# Patient Record
Sex: MALE | Race: WHITE | NOT HISPANIC OR LATINO | Employment: UNEMPLOYED | URBAN - METROPOLITAN AREA
[De-identification: names, ages, dates, MRNs, and addresses within clinical notes are randomized per-mention and may not be internally consistent; named-entity substitution may affect disease eponyms.]

---

## 2020-08-03 ENCOUNTER — HOSPITAL ENCOUNTER (EMERGENCY)
Facility: HOSPITAL | Age: 65
Discharge: HOME | End: 2020-08-04
Attending: FAMILY MEDICINE
Payer: COMMERCIAL

## 2020-08-03 DIAGNOSIS — N20.1 RIGHT URETERAL STONE: Primary | ICD-10-CM

## 2020-08-03 LAB
ALBUMIN SERPL BCP-MCNC: 3.9 G/DL (ref 3.5–5.2)
ALP SERPL-CCNC: 62 U/L (ref 55–135)
ALT SERPL W/O P-5'-P-CCNC: 36 U/L (ref 10–44)
ANION GAP SERPL CALC-SCNC: 11 MMOL/L (ref 8–16)
AST SERPL-CCNC: 29 U/L (ref 10–40)
BACTERIA #/AREA URNS HPF: ABNORMAL /HPF
BASOPHILS # BLD AUTO: 0.11 K/UL (ref 0–0.2)
BASOPHILS NFR BLD: 1.2 % (ref 0–1.9)
BILIRUB SERPL-MCNC: 0.5 MG/DL (ref 0.1–1)
BILIRUB UR QL STRIP: NEGATIVE
BUN SERPL-MCNC: 21 MG/DL (ref 8–23)
CALCIUM SERPL-MCNC: 9.5 MG/DL (ref 8.7–10.5)
CHLORIDE SERPL-SCNC: 102 MMOL/L (ref 95–110)
CLARITY UR: CLEAR
CO2 SERPL-SCNC: 29 MMOL/L (ref 23–29)
COLOR UR: YELLOW
CREAT SERPL-MCNC: 1.5 MG/DL (ref 0.5–1.4)
DIFFERENTIAL METHOD: NORMAL
EOSINOPHIL # BLD AUTO: 0.3 K/UL (ref 0–0.5)
EOSINOPHIL NFR BLD: 3.6 % (ref 0–8)
ERYTHROCYTE [DISTWIDTH] IN BLOOD BY AUTOMATED COUNT: 11.8 % (ref 11.5–14.5)
EST. GFR  (AFRICAN AMERICAN): 56.9 ML/MIN/1.73 M^2
EST. GFR  (NON AFRICAN AMERICAN): 49.2 ML/MIN/1.73 M^2
GLUCOSE SERPL-MCNC: 100 MG/DL (ref 70–110)
GLUCOSE UR QL STRIP: NEGATIVE
HCT VFR BLD AUTO: 41.8 % (ref 40–54)
HGB BLD-MCNC: 14 G/DL (ref 14–18)
HGB UR QL STRIP: ABNORMAL
IMM GRANULOCYTES # BLD AUTO: 0.02 K/UL (ref 0–0.04)
IMM GRANULOCYTES NFR BLD AUTO: 0.2 % (ref 0–0.5)
KETONES UR QL STRIP: NEGATIVE
LEUKOCYTE ESTERASE UR QL STRIP: NEGATIVE
LIPASE SERPL-CCNC: 36 U/L (ref 4–60)
LYMPHOCYTES # BLD AUTO: 3 K/UL (ref 1–4.8)
LYMPHOCYTES NFR BLD: 32.2 % (ref 18–48)
MCH RBC QN AUTO: 29.8 PG (ref 27–31)
MCHC RBC AUTO-ENTMCNC: 33.5 G/DL (ref 32–36)
MCV RBC AUTO: 89 FL (ref 82–98)
MICROSCOPIC COMMENT: ABNORMAL
MONOCYTES # BLD AUTO: 1 K/UL (ref 0.3–1)
MONOCYTES NFR BLD: 10.1 % (ref 4–15)
NEUTROPHILS # BLD AUTO: 5 K/UL (ref 1.8–7.7)
NEUTROPHILS NFR BLD: 52.7 % (ref 38–73)
NITRITE UR QL STRIP: NEGATIVE
NRBC BLD-RTO: 0 /100 WBC
PH UR STRIP: 8 [PH] (ref 5–8)
PLATELET # BLD AUTO: 261 K/UL (ref 150–350)
PMV BLD AUTO: 9.5 FL (ref 9.2–12.9)
POTASSIUM SERPL-SCNC: 4.1 MMOL/L (ref 3.5–5.1)
PROT SERPL-MCNC: 6.9 G/DL (ref 6–8.4)
PROT UR QL STRIP: NEGATIVE
RBC # BLD AUTO: 4.7 M/UL (ref 4.6–6.2)
RBC #/AREA URNS HPF: 25 /HPF (ref 0–4)
SODIUM SERPL-SCNC: 142 MMOL/L (ref 136–145)
SP GR UR STRIP: 1.02 (ref 1–1.03)
SQUAMOUS #/AREA URNS HPF: 2 /HPF
URN SPEC COLLECT METH UR: ABNORMAL
UROBILINOGEN UR STRIP-ACNC: NEGATIVE EU/DL
WBC # BLD AUTO: 9.45 K/UL (ref 3.9–12.7)
WBC #/AREA URNS HPF: 2 /HPF (ref 0–5)

## 2020-08-03 PROCEDURE — 81000 URINALYSIS NONAUTO W/SCOPE: CPT

## 2020-08-03 PROCEDURE — 96374 THER/PROPH/DIAG INJ IV PUSH: CPT

## 2020-08-03 PROCEDURE — 99284 EMERGENCY DEPT VISIT MOD MDM: CPT | Mod: 25

## 2020-08-03 PROCEDURE — 96375 TX/PRO/DX INJ NEW DRUG ADDON: CPT

## 2020-08-03 PROCEDURE — 83690 ASSAY OF LIPASE: CPT

## 2020-08-03 PROCEDURE — 96361 HYDRATE IV INFUSION ADD-ON: CPT

## 2020-08-03 PROCEDURE — 85025 COMPLETE CBC W/AUTO DIFF WBC: CPT

## 2020-08-03 PROCEDURE — 25000003 PHARM REV CODE 250: Performed by: FAMILY MEDICINE

## 2020-08-03 PROCEDURE — 63600175 PHARM REV CODE 636 W HCPCS: Performed by: FAMILY MEDICINE

## 2020-08-03 PROCEDURE — 80053 COMPREHEN METABOLIC PANEL: CPT

## 2020-08-03 RX ORDER — NEBIVOLOL 10 MG/1
10 TABLET ORAL NIGHTLY
COMMUNITY

## 2020-08-03 RX ORDER — KETOROLAC TROMETHAMINE 30 MG/ML
30 INJECTION, SOLUTION INTRAMUSCULAR; INTRAVENOUS
Status: COMPLETED | OUTPATIENT
Start: 2020-08-03 | End: 2020-08-03

## 2020-08-03 RX ORDER — TELMISARTAN 80 MG/1
40 TABLET ORAL DAILY
COMMUNITY
End: 2020-08-19

## 2020-08-03 RX ORDER — EZETIMIBE 10 MG/1
10 TABLET ORAL NIGHTLY
COMMUNITY

## 2020-08-03 RX ORDER — ONDANSETRON 2 MG/ML
4 INJECTION INTRAMUSCULAR; INTRAVENOUS
Status: COMPLETED | OUTPATIENT
Start: 2020-08-03 | End: 2020-08-03

## 2020-08-03 RX ORDER — HYDROMORPHONE HYDROCHLORIDE 2 MG/ML
0.5 INJECTION, SOLUTION INTRAMUSCULAR; INTRAVENOUS; SUBCUTANEOUS
Status: COMPLETED | OUTPATIENT
Start: 2020-08-03 | End: 2020-08-03

## 2020-08-03 RX ORDER — ROSUVASTATIN CALCIUM 40 MG/1
10 TABLET, COATED ORAL NIGHTLY
COMMUNITY

## 2020-08-03 RX ADMIN — ONDANSETRON HYDROCHLORIDE 4 MG: 2 SOLUTION INTRAMUSCULAR; INTRAVENOUS at 11:08

## 2020-08-03 RX ADMIN — SODIUM CHLORIDE 1000 ML: 0.9 INJECTION, SOLUTION INTRAVENOUS at 10:08

## 2020-08-03 RX ADMIN — HYDROMORPHONE HYDROCHLORIDE 0.5 MG: 2 INJECTION, SOLUTION INTRAMUSCULAR; INTRAVENOUS; SUBCUTANEOUS at 11:08

## 2020-08-03 RX ADMIN — KETOROLAC TROMETHAMINE 30 MG: 30 INJECTION, SOLUTION INTRAMUSCULAR at 10:08

## 2020-08-04 VITALS
DIASTOLIC BLOOD PRESSURE: 96 MMHG | HEIGHT: 73 IN | WEIGHT: 128 LBS | HEART RATE: 63 BPM | RESPIRATION RATE: 16 BRPM | TEMPERATURE: 98 F | OXYGEN SATURATION: 96 % | SYSTOLIC BLOOD PRESSURE: 182 MMHG | BODY MASS INDEX: 16.96 KG/M2

## 2020-08-04 PROCEDURE — 63600175 PHARM REV CODE 636 W HCPCS: Performed by: FAMILY MEDICINE

## 2020-08-04 PROCEDURE — 96376 TX/PRO/DX INJ SAME DRUG ADON: CPT

## 2020-08-04 RX ORDER — HYDROCODONE BITARTRATE AND ACETAMINOPHEN 5; 325 MG/1; MG/1
1 TABLET ORAL EVERY 8 HOURS PRN
Qty: 10 TABLET | Refills: 0 | Status: SHIPPED | OUTPATIENT
Start: 2020-08-04 | End: 2020-08-19

## 2020-08-04 RX ORDER — HYDROMORPHONE HYDROCHLORIDE 2 MG/ML
0.5 INJECTION, SOLUTION INTRAMUSCULAR; INTRAVENOUS; SUBCUTANEOUS
Status: COMPLETED | OUTPATIENT
Start: 2020-08-04 | End: 2020-08-04

## 2020-08-04 RX ORDER — CIPROFLOXACIN 500 MG/1
500 TABLET ORAL 2 TIMES DAILY
Qty: 10 TABLET | Refills: 0 | Status: SHIPPED | OUTPATIENT
Start: 2020-08-04 | End: 2020-08-09

## 2020-08-04 RX ORDER — TAMSULOSIN HYDROCHLORIDE 0.4 MG/1
0.4 CAPSULE ORAL DAILY
Qty: 10 CAPSULE | Refills: 0 | Status: SHIPPED | OUTPATIENT
Start: 2020-08-04 | End: 2020-08-19

## 2020-08-04 RX ADMIN — HYDROMORPHONE HYDROCHLORIDE 0.5 MG: 2 INJECTION, SOLUTION INTRAMUSCULAR; INTRAVENOUS; SUBCUTANEOUS at 12:08

## 2020-08-04 NOTE — ED PROVIDER NOTES
Encounter Date: 8/3/2020       History     Chief Complaint   Patient presents with    Flank Pain     right flank pain since yesterday    Groin Pain    Hypertension     64-year-old male presents complaining of right flank radiating to the right groin onset approximately 2 or 3 hr ago he has not had history of similar problems in the past no history kidney stones no history of diverticulitis Crohn's disease gallbladder disease or ulcerative colitis        Review of patient's allergies indicates:  No Known Allergies  Past Medical History:   Diagnosis Date    Hyperlipemia     Hypertension      History reviewed. No pertinent surgical history.  History reviewed. No pertinent family history.  Social History     Tobacco Use    Smoking status: Never Smoker   Substance Use Topics    Alcohol use: Never     Frequency: Never    Drug use: Not on file     Review of Systems   Constitutional: Negative for fever.   HENT: Negative for sore throat.    Respiratory: Negative for shortness of breath.    Cardiovascular: Negative for chest pain.   Gastrointestinal: Negative for nausea.   Genitourinary: Positive for flank pain. Negative for dysuria and frequency.   Musculoskeletal: Negative for back pain.   Skin: Negative for rash.   Neurological: Positive for headaches. Negative for weakness.   Hematological: Does not bruise/bleed easily.       Physical Exam     Initial Vitals   BP Pulse Resp Temp SpO2   08/03/20 2219 08/03/20 2302 08/03/20 2219 08/03/20 2219 08/03/20 2219   (!) 200/115 (!) 54 18 97.8 °F (36.6 °C) 100 %      MAP       --                Physical Exam    Nursing note and vitals reviewed.  Constitutional: He appears well-developed and well-nourished. He is not diaphoretic. No distress.   HENT:   Head: Normocephalic and atraumatic.   Right Ear: External ear normal.   Left Ear: External ear normal.   Nose: Nose normal.   Mouth/Throat: Oropharynx is clear and moist. No oropharyngeal exudate.   Eyes: EOM are normal.    Neck: Normal range of motion. Neck supple. No tracheal deviation present.   Cardiovascular: Normal rate and regular rhythm.   No murmur heard.  Pulmonary/Chest: Breath sounds normal. No stridor. No respiratory distress. He has no rales.   Abdominal: Soft. He exhibits no distension and no mass. There is no abdominal tenderness. There is no rebound.   Musculoskeletal: Normal range of motion. No tenderness or edema.   Lymphadenopathy:     He has no cervical adenopathy.   Neurological: He is alert and oriented to person, place, and time. He has normal strength.   Skin: Skin is warm and dry. Capillary refill takes less than 2 seconds. No pallor.   Psychiatric: He has a normal mood and affect.         ED Course   Procedures  Labs Reviewed   COMPREHENSIVE METABOLIC PANEL - Abnormal; Notable for the following components:       Result Value    Creatinine 1.5 (*)     eGFR if  56.9 (*)     eGFR if non  49.2 (*)     All other components within normal limits   URINALYSIS, REFLEX TO URINE CULTURE - Abnormal; Notable for the following components:    Occult Blood UA 1+ (*)     All other components within normal limits    Narrative:     Specimen Source->Urine   URINALYSIS MICROSCOPIC - Abnormal; Notable for the following components:    RBC, UA 25 (*)     Bacteria Moderate (*)     All other components within normal limits    Narrative:     Specimen Source->Urine   CBC W/ AUTO DIFFERENTIAL   LIPASE          Imaging Results          CT Renal Stone Study ABD Pelvis WO (In process)                  Medical Decision Making:   CT report appreciated I discussed with the patient the likely he will completely passed the kidney stone to his bladder and then after the bladder without difficulty he will be placed on outpatient Flomax Cipro and given analgesics p.r.n.                                 Clinical Impression:       ICD-10-CM ICD-9-CM   1. Right ureteral stone  N20.1 592.1             ED Disposition  Condition    Discharge Stable        ED Prescriptions     Medication Sig Dispense Start Date End Date Auth. Provider    tamsulosin (FLOMAX) 0.4 mg Cap Take 1 capsule (0.4 mg total) by mouth once daily. 10 capsule 8/4/2020 8/4/2021 Lucius Dotson MD    HYDROcodone-acetaminophen (NORCO) 5-325 mg per tablet Take 1 tablet by mouth every 8 (eight) hours as needed for Pain. 10 tablet 8/4/2020  Lucius Dotson MD    ciprofloxacin HCl (CIPRO) 500 MG tablet Take 1 tablet (500 mg total) by mouth 2 (two) times daily. for 5 days 10 tablet 8/4/2020 8/9/2020 Lucius Dotson MD        Follow-up Information    None                                    Lucius Dotson MD  08/04/20 7062

## 2020-08-04 NOTE — DISCHARGE INSTRUCTIONS
The patient received a narcotic analgesics here in the ED on 08/04/2020 in case it shows positive arm a future urine drug screen

## 2020-08-19 ENCOUNTER — HOSPITAL ENCOUNTER (OUTPATIENT)
Facility: HOSPITAL | Age: 65
Discharge: HOME | End: 2020-08-20
Attending: EMERGENCY MEDICINE | Admitting: INTERNAL MEDICINE
Payer: COMMERCIAL

## 2020-08-19 DIAGNOSIS — R41.82 ALTERED MENTAL STATUS: ICD-10-CM

## 2020-08-19 DIAGNOSIS — R07.9 CHEST PAIN: ICD-10-CM

## 2020-08-19 DIAGNOSIS — R55 SYNCOPE: Primary | ICD-10-CM

## 2020-08-19 LAB
ALBUMIN SERPL BCP-MCNC: 4.4 G/DL (ref 3.5–5.2)
ALP SERPL-CCNC: 61 U/L (ref 55–135)
ALT SERPL W/O P-5'-P-CCNC: 50 U/L (ref 10–44)
ANION GAP SERPL CALC-SCNC: 11 MMOL/L (ref 8–16)
AST SERPL-CCNC: 43 U/L (ref 10–40)
BASOPHILS # BLD AUTO: 0.09 K/UL (ref 0–0.2)
BASOPHILS NFR BLD: 0.8 % (ref 0–1.9)
BILIRUB SERPL-MCNC: 0.7 MG/DL (ref 0.1–1)
BNP SERPL-MCNC: 13 PG/ML (ref 0–99)
BUN SERPL-MCNC: 23 MG/DL (ref 8–23)
CALCIUM SERPL-MCNC: 9.9 MG/DL (ref 8.7–10.5)
CHLORIDE SERPL-SCNC: 102 MMOL/L (ref 95–110)
CO2 SERPL-SCNC: 29 MMOL/L (ref 23–29)
CREAT SERPL-MCNC: 1.6 MG/DL (ref 0.5–1.4)
DIFFERENTIAL METHOD: ABNORMAL
EOSINOPHIL # BLD AUTO: 0.2 K/UL (ref 0–0.5)
EOSINOPHIL NFR BLD: 2.1 % (ref 0–8)
ERYTHROCYTE [DISTWIDTH] IN BLOOD BY AUTOMATED COUNT: 11.8 % (ref 11.5–14.5)
EST. GFR  (AFRICAN AMERICAN): 52.6 ML/MIN/1.73 M^2
EST. GFR  (NON AFRICAN AMERICAN): 45.5 ML/MIN/1.73 M^2
GLUCOSE SERPL-MCNC: 108 MG/DL (ref 70–110)
HCT VFR BLD AUTO: 43.8 % (ref 40–54)
HGB BLD-MCNC: 14.3 G/DL (ref 14–18)
IMM GRANULOCYTES # BLD AUTO: 0.04 K/UL (ref 0–0.04)
IMM GRANULOCYTES NFR BLD AUTO: 0.4 % (ref 0–0.5)
LYMPHOCYTES # BLD AUTO: 1.7 K/UL (ref 1–4.8)
LYMPHOCYTES NFR BLD: 16.1 % (ref 18–48)
MCH RBC QN AUTO: 29.4 PG (ref 27–31)
MCHC RBC AUTO-ENTMCNC: 32.6 G/DL (ref 32–36)
MCV RBC AUTO: 90 FL (ref 82–98)
MONOCYTES # BLD AUTO: 0.7 K/UL (ref 0.3–1)
MONOCYTES NFR BLD: 6.2 % (ref 4–15)
NEUTROPHILS # BLD AUTO: 7.9 K/UL (ref 1.8–7.7)
NEUTROPHILS NFR BLD: 74.4 % (ref 38–73)
NRBC BLD-RTO: 0 /100 WBC
PLATELET # BLD AUTO: 296 K/UL (ref 150–350)
PMV BLD AUTO: 9.1 FL (ref 9.2–12.9)
POTASSIUM SERPL-SCNC: 3.9 MMOL/L (ref 3.5–5.1)
PROT SERPL-MCNC: 7.5 G/DL (ref 6–8.4)
RBC # BLD AUTO: 4.87 M/UL (ref 4.6–6.2)
SODIUM SERPL-SCNC: 142 MMOL/L (ref 136–145)
TROPONIN I SERPL DL<=0.01 NG/ML-MCNC: <0.03 NG/ML
WBC # BLD AUTO: 10.59 K/UL (ref 3.9–12.7)

## 2020-08-19 PROCEDURE — 84484 ASSAY OF TROPONIN QUANT: CPT

## 2020-08-19 PROCEDURE — 85025 COMPLETE CBC W/AUTO DIFF WBC: CPT

## 2020-08-19 PROCEDURE — 80053 COMPREHEN METABOLIC PANEL: CPT

## 2020-08-19 PROCEDURE — 93005 ELECTROCARDIOGRAM TRACING: CPT | Performed by: INTERNAL MEDICINE

## 2020-08-19 PROCEDURE — 83880 ASSAY OF NATRIURETIC PEPTIDE: CPT

## 2020-08-19 PROCEDURE — 99285 EMERGENCY DEPT VISIT HI MDM: CPT | Mod: 25

## 2020-08-19 RX ORDER — HYDROCHLOROTHIAZIDE 12.5 MG/1
12.5 TABLET ORAL DAILY
Status: ON HOLD | COMMUNITY
Start: 2020-08-13 | End: 2020-08-20 | Stop reason: HOSPADM

## 2020-08-19 RX ORDER — AMLODIPINE BESYLATE 5 MG/1
10 TABLET ORAL DAILY
Status: ON HOLD | COMMUNITY
Start: 2020-08-06 | End: 2020-10-27

## 2020-08-19 RX ORDER — TELMISARTAN 80 MG/1
80 TABLET ORAL NIGHTLY
COMMUNITY

## 2020-08-20 ENCOUNTER — CLINICAL SUPPORT (OUTPATIENT)
Dept: CARDIOLOGY | Facility: HOSPITAL | Age: 65
End: 2020-08-20
Attending: INTERNAL MEDICINE
Payer: COMMERCIAL

## 2020-08-20 VITALS
OXYGEN SATURATION: 94 % | BODY MASS INDEX: 29.16 KG/M2 | HEIGHT: 73 IN | TEMPERATURE: 98 F | HEART RATE: 71 BPM | SYSTOLIC BLOOD PRESSURE: 145 MMHG | RESPIRATION RATE: 16 BRPM | DIASTOLIC BLOOD PRESSURE: 79 MMHG | WEIGHT: 220 LBS

## 2020-08-20 LAB
ANION GAP SERPL CALC-SCNC: 9 MMOL/L (ref 8–16)
AORTIC ROOT ANNULUS: 3.42 CM
AORTIC VALVE CUSP SEPERATION: 1.9 CM
AV INDEX (PROSTH): 1.15
AV MEAN GRADIENT: 4 MMHG
AV PEAK GRADIENT: 8 MMHG
AV VALVE AREA: 3.64 CM2
AV VELOCITY RATIO: 95.91
BASOPHILS # BLD AUTO: 0.1 K/UL (ref 0–0.2)
BASOPHILS NFR BLD: 1.3 % (ref 0–1.9)
BSA FOR ECHO PROCEDURE: 2.27 M2
BUN SERPL-MCNC: 20 MG/DL (ref 8–23)
CALCIUM SERPL-MCNC: 9.7 MG/DL (ref 8.7–10.5)
CHLORIDE SERPL-SCNC: 103 MMOL/L (ref 95–110)
CO2 SERPL-SCNC: 28 MMOL/L (ref 23–29)
CREAT SERPL-MCNC: 1.3 MG/DL (ref 0.5–1.4)
CV ECHO LV RWT: 0.68 CM
DIFFERENTIAL METHOD: ABNORMAL
DOP CALC AO PEAK VEL: 1.44 M/S
DOP CALC AO VTI: 25.44 CM
DOP CALC LVOT AREA: 3.2 CM2
DOP CALC LVOT DIAMETER: 2.01 CM
DOP CALC LVOT PEAK VEL: 138.11 M/S
DOP CALC LVOT STROKE VOLUME: 92.51 CM3
DOP CALCLVOT PEAK VEL VTI: 29.17 CM
E WAVE DECELERATION TIME: 256.92 MSEC
E/A RATIO: 0.74
E/E' RATIO: 6.12 M/S
ECHO LV POSTERIOR WALL: 1.39 CM (ref 0.6–1.1)
EOSINOPHIL # BLD AUTO: 0.3 K/UL (ref 0–0.5)
EOSINOPHIL NFR BLD: 3.2 % (ref 0–8)
ERYTHROCYTE [DISTWIDTH] IN BLOOD BY AUTOMATED COUNT: 11.9 % (ref 11.5–14.5)
EST. GFR  (AFRICAN AMERICAN): >60 ML/MIN/1.73 M^2
EST. GFR  (NON AFRICAN AMERICAN): 58.5 ML/MIN/1.73 M^2
FRACTIONAL SHORTENING: 48 % (ref 28–44)
GLUCOSE SERPL-MCNC: 99 MG/DL (ref 70–110)
HCT VFR BLD AUTO: 42.1 % (ref 40–54)
HGB BLD-MCNC: 13.8 G/DL (ref 14–18)
IMM GRANULOCYTES # BLD AUTO: 0.02 K/UL (ref 0–0.04)
IMM GRANULOCYTES NFR BLD AUTO: 0.3 % (ref 0–0.5)
INTERVENTRICULAR SEPTUM: 1.38 CM (ref 0.6–1.1)
IVRT: 95.31 MSEC
LEFT ATRIUM SIZE: 4.1 CM
LEFT INTERNAL DIMENSION IN SYSTOLE: 2.13 CM (ref 2.1–4)
LEFT VENTRICLE DIASTOLIC VOLUME INDEX: 51.07 ML/M2
LEFT VENTRICLE DIASTOLIC VOLUME: 114.43 ML
LEFT VENTRICLE MASS INDEX: 95 G/M2
LEFT VENTRICLE SYSTOLIC VOLUME INDEX: 24.5 ML/M2
LEFT VENTRICLE SYSTOLIC VOLUME: 54.98 ML
LEFT VENTRICULAR INTERNAL DIMENSION IN DIASTOLE: 4.1 CM (ref 3.5–6)
LEFT VENTRICULAR MASS: 213.03 G
LV LATERAL E/E' RATIO: 4.73 M/S
LV SEPTAL E/E' RATIO: 8.67 M/S
LYMPHOCYTES # BLD AUTO: 1.9 K/UL (ref 1–4.8)
LYMPHOCYTES NFR BLD: 24.5 % (ref 18–48)
MAGNESIUM SERPL-MCNC: 2.1 MG/DL (ref 1.6–2.6)
MCH RBC QN AUTO: 29.6 PG (ref 27–31)
MCHC RBC AUTO-ENTMCNC: 32.8 G/DL (ref 32–36)
MCV RBC AUTO: 90 FL (ref 82–98)
MONOCYTES # BLD AUTO: 0.7 K/UL (ref 0.3–1)
MONOCYTES NFR BLD: 9.3 % (ref 4–15)
MV PEAK A VEL: 0.7 M/S
MV PEAK E VEL: 0.52 M/S
NEUTROPHILS # BLD AUTO: 4.8 K/UL (ref 1.8–7.7)
NEUTROPHILS NFR BLD: 61.4 % (ref 38–73)
NRBC BLD-RTO: 0 /100 WBC
PLATELET # BLD AUTO: 294 K/UL (ref 150–350)
PMV BLD AUTO: 9.5 FL (ref 9.2–12.9)
POTASSIUM SERPL-SCNC: 4 MMOL/L (ref 3.5–5.1)
PV PEAK VELOCITY: 118.56 CM/S
RA PRESSURE: 3 MMHG
RBC # BLD AUTO: 4.67 M/UL (ref 4.6–6.2)
SARS-COV-2 RDRP RESP QL NAA+PROBE: NEGATIVE
SODIUM SERPL-SCNC: 140 MMOL/L (ref 136–145)
TDI LATERAL: 0.11 M/S
TDI SEPTAL: 0.06 M/S
TDI: 0.09 M/S
TROPONIN I SERPL DL<=0.01 NG/ML-MCNC: <0.03 NG/ML
TROPONIN I SERPL DL<=0.01 NG/ML-MCNC: <0.03 NG/ML
WBC # BLD AUTO: 7.83 K/UL (ref 3.9–12.7)

## 2020-08-20 PROCEDURE — U0002 COVID-19 LAB TEST NON-CDC: HCPCS

## 2020-08-20 PROCEDURE — G0378 HOSPITAL OBSERVATION PER HR: HCPCS

## 2020-08-20 PROCEDURE — 84484 ASSAY OF TROPONIN QUANT: CPT | Mod: 91

## 2020-08-20 PROCEDURE — 85025 COMPLETE CBC W/AUTO DIFF WBC: CPT

## 2020-08-20 PROCEDURE — 94760 N-INVAS EAR/PLS OXIMETRY 1: CPT

## 2020-08-20 PROCEDURE — 83735 ASSAY OF MAGNESIUM: CPT

## 2020-08-20 PROCEDURE — 36415 COLL VENOUS BLD VENIPUNCTURE: CPT

## 2020-08-20 PROCEDURE — 84484 ASSAY OF TROPONIN QUANT: CPT

## 2020-08-20 PROCEDURE — 25000003 PHARM REV CODE 250: Performed by: INTERNAL MEDICINE

## 2020-08-20 PROCEDURE — 93306 TTE W/DOPPLER COMPLETE: CPT

## 2020-08-20 PROCEDURE — 80048 BASIC METABOLIC PNL TOTAL CA: CPT

## 2020-08-20 RX ORDER — SODIUM CHLORIDE 0.9 % (FLUSH) 0.9 %
10 SYRINGE (ML) INJECTION
Status: DISCONTINUED | OUTPATIENT
Start: 2020-08-20 | End: 2020-08-20 | Stop reason: HOSPADM

## 2020-08-20 RX ORDER — IBUPROFEN 200 MG
24 TABLET ORAL
Status: DISCONTINUED | OUTPATIENT
Start: 2020-08-20 | End: 2020-08-20 | Stop reason: HOSPADM

## 2020-08-20 RX ORDER — ACETAMINOPHEN 325 MG/1
650 TABLET ORAL EVERY 4 HOURS PRN
Status: DISCONTINUED | OUTPATIENT
Start: 2020-08-20 | End: 2020-08-20 | Stop reason: HOSPADM

## 2020-08-20 RX ORDER — AMLODIPINE BESYLATE 5 MG/1
5 TABLET ORAL DAILY
Status: DISCONTINUED | OUTPATIENT
Start: 2020-08-20 | End: 2020-08-20 | Stop reason: HOSPADM

## 2020-08-20 RX ORDER — EZETIMIBE 10 MG/1
10 TABLET ORAL DAILY
Status: DISCONTINUED | OUTPATIENT
Start: 2020-08-20 | End: 2020-08-20 | Stop reason: HOSPADM

## 2020-08-20 RX ORDER — GLUCAGON 1 MG
1 KIT INJECTION
Status: DISCONTINUED | OUTPATIENT
Start: 2020-08-20 | End: 2020-08-20 | Stop reason: HOSPADM

## 2020-08-20 RX ORDER — ROSUVASTATIN CALCIUM 10 MG/1
10 TABLET, COATED ORAL NIGHTLY
Status: DISCONTINUED | OUTPATIENT
Start: 2020-08-20 | End: 2020-08-20 | Stop reason: HOSPADM

## 2020-08-20 RX ORDER — IBUPROFEN 200 MG
16 TABLET ORAL
Status: DISCONTINUED | OUTPATIENT
Start: 2020-08-20 | End: 2020-08-20 | Stop reason: HOSPADM

## 2020-08-20 RX ORDER — NEBIVOLOL 10 MG/1
10 TABLET ORAL DAILY
Status: DISCONTINUED | OUTPATIENT
Start: 2020-08-20 | End: 2020-08-20 | Stop reason: HOSPADM

## 2020-08-20 RX ORDER — TELMISARTAN 40 MG/1
40 TABLET ORAL DAILY
Status: DISCONTINUED | OUTPATIENT
Start: 2020-08-20 | End: 2020-08-20 | Stop reason: HOSPADM

## 2020-08-20 RX ADMIN — TELMISARTAN 40 MG: 40 TABLET ORAL at 09:08

## 2020-08-20 RX ADMIN — AMLODIPINE BESYLATE 5 MG: 5 TABLET ORAL at 09:08

## 2020-08-20 RX ADMIN — EZETIMIBE 10 MG: 10 TABLET ORAL at 08:08

## 2020-08-20 RX ADMIN — ROSUVASTATIN CALCIUM 10 MG: 10 TABLET, FILM COATED ORAL at 02:08

## 2020-08-20 RX ADMIN — NEBIVOLOL HYDROCHLORIDE 10 MG: 10 TABLET ORAL at 08:08

## 2020-08-20 NOTE — NURSING
Patient does not have tele monitor on at this time. States he is being discharged. patient encouraged to wear the tele monitor and educated. Patient refused monitor at this time. Placed a call to MD awaiting on him to arrive to unit to talk with patient.

## 2020-08-20 NOTE — H&P
"Hospital Medicine H&P    Date of Admit: 8/19/2020  Date of Transfer: 8/19/2020    Subjective:      History of Present Illness / Brief Hospital Course:  David Dunn is a 64 y.o. male who  has a past medical history of Hyperlipemia and Hypertension.. The patient presented to the on 8/19/2020 with a primary complaint of Loss of Consciousness (Pt states suddenly didnt feel well and next thing he knew his wife was standing over him.)    He was feeling normal today until this evening when, while talking with his wife sitting upright in a chair, he suddenly started to feel lightheaded and like "all the blood drained out" of his head. His wife says he became unresponsive and apneic for 5-10 seconds and then spontaneously came to. He says he was a little confused for maybe 30 seconds after he regained consciousness. He says he felt a transient few seconds of chest discomfort like tightness after the episode.   Of note, he was started on HCTZ about a week ago per his cardiologist, Dr. Cruz.   Denied any incontinence, convulsions, vision changes, palpitations, sob, n/v/d, abd pain, diaphoresis.     Past Medical History:  Past Medical History:   Diagnosis Date    Hyperlipemia     Hypertension        Past Surgical History:  No past surgical history on file.    Allergies:  Review of patient's allergies indicates:  No Known Allergies    Home Medications:  Prior to Admission medications    Medication Sig Start Date End Date Taking? Authorizing Provider   telmisartan (MICARDIS) 80 MG Tab Take 40 mg by mouth once daily.   Yes Historical Provider, MD   amLODIPine (NORVASC) 5 MG tablet Take 5 mg by mouth once daily. 8/6/20   Historical Provider, MD   ezetimibe (ZETIA) 10 mg tablet Take 10 mg by mouth once daily.    Historical Provider, MD   hydroCHLOROthiazide (HYDRODIURIL) 12.5 MG Tab Take 12.5 mg by mouth once daily. 8/13/20   Historical Provider, MD   nebivoloL (BYSTOLIC) 10 MG Tab Take 10 mg by mouth once daily.    " "Historical Provider, MD   rosuvastatin (CRESTOR) 40 MG Tab Take 10 mg by mouth every evening.    Historical Provider, MD   HYDROcodone-acetaminophen (NORCO) 5-325 mg per tablet Take 1 tablet by mouth every 8 (eight) hours as needed for Pain. 20  Lucius Dotson MD   tamsulosin (FLOMAX) 0.4 mg Cap Take 1 capsule (0.4 mg total) by mouth once daily. 20  Lucius Dotson MD   telmisartan (MICARDIS) 80 MG Tab Take 40 mg by mouth once daily.  20  Historical Provider, MD       Family History:  Reviewed, non contributory     Social History:  Social History     Tobacco Use    Smoking status: Never Smoker   Substance Use Topics    Alcohol use: Never     Frequency: Never    Drug use: Not on file       Review of Systems:  Pertinent items are noted in HPI. All other systems are reviewed and are negative.     Objective:   Last 24 Hour Vital Signs:  BP  Min: 100/71  Max: 100/71  Temp  Av.6 °F (36.4 °C)  Min: 97.6 °F (36.4 °C)  Max: 97.6 °F (36.4 °C)  Pulse  Av  Min: 75  Max: 75  Resp  Av  Min: 16  Max: 16  SpO2  Av %  Min: 99 %  Max: 99 %  Height  Av' 1" (185.4 cm)  Min: 6' 1" (185.4 cm)  Max: 6' 1" (185.4 cm)  Weight  Av.8 kg (165 lb)  Min: 74.8 kg (165 lb)  Max: 74.8 kg (165 lb)  Body mass index is 21.77 kg/m².  No intake/output data recorded.    Physical Examination:  Physical Exam  Vitals signs reviewed.   Constitutional:       General: He is not in acute distress.     Appearance: Normal appearance.   HENT:      Head: Normocephalic and atraumatic.      Mouth/Throat:      Mouth: Mucous membranes are moist.      Pharynx: Oropharynx is clear.   Eyes:      Extraocular Movements: Extraocular movements intact.      Pupils: Pupils are equal, round, and reactive to light.   Neck:      Musculoskeletal: Normal range of motion and neck supple.      Vascular: No carotid bruit.   Cardiovascular:      Rate and Rhythm: Normal rate and regular rhythm.      Pulses: Normal " pulses.   Pulmonary:      Effort: Pulmonary effort is normal. No respiratory distress.   Abdominal:      General: Abdomen is flat. There is no distension.      Tenderness: There is no abdominal tenderness.   Musculoskeletal:         General: No swelling or deformity.   Skin:     General: Skin is warm and dry.      Capillary Refill: Capillary refill takes less than 2 seconds.   Neurological:      General: No focal deficit present.      Mental Status: He is alert and oriented to person, place, and time.      Sensory: No sensory deficit.      Motor: No weakness.   Psychiatric:         Mood and Affect: Mood normal.         Behavior: Behavior normal.           Laboratory:  Most Recent Data:  CBC:   Lab Results   Component Value Date    WBC 10.59 08/19/2020    HGB 14.3 08/19/2020    HCT 43.8 08/19/2020     08/19/2020    MCV 90 08/19/2020    RDW 11.8 08/19/2020       BMP:   Lab Results   Component Value Date     08/19/2020    K 3.9 08/19/2020     08/19/2020    CO2 29 08/19/2020    BUN 23 08/19/2020     08/19/2020    CALCIUM 9.9 08/19/2020     LFTs:   Lab Results   Component Value Date    PROT 7.5 08/19/2020    ALBUMIN 4.4 08/19/2020    BILITOT 0.7 08/19/2020    AST 43 (H) 08/19/2020    ALKPHOS 61 08/19/2020    ALT 50 (H) 08/19/2020     Coags: No results found for: INR, PROTIME, PTT  FLP: No results found for: CHOL, HDL, LDLCALC, TRIG, CHOLHDL  DM:   Lab Results   Component Value Date    CREATININE 1.6 (H) 08/19/2020     Thyroid: No results found for: TSH, FREET4, O0WORIP, R1BJPDS, THYROIDAB  Anemia: No results found for: IRON, TIBC, FERRITIN, TQOMARUA68, FOLATE  Cardiac:   Lab Results   Component Value Date    TROPONINI <0.030 08/19/2020    BNP 13 08/19/2020     Urinalysis:   Lab Results   Component Value Date    COLORU Yellow 08/03/2020    SPECGRAV 1.020 08/03/2020    NITRITE Negative 08/03/2020    KETONESU Negative 08/03/2020    UROBILINOGEN Negative 08/03/2020    WBCUA 2 08/03/2020        Trended Lab Data:  Recent Labs   Lab 08/19/20 2124   WBC 10.59   HGB 14.3   HCT 43.8      MCV 90   RDW 11.8      K 3.9      CO2 29   BUN 23      CALCIUM 9.9   PROT 7.5   ALBUMIN 4.4   BILITOT 0.7   AST 43*   ALKPHOS 61   ALT 50*       Trended Cardiac Data:  Recent Labs   Lab 08/19/20 2124   TROPONINI <0.030   BNP 13         Other Results:  EKG (my interpretation): sinus glory, no ischemic changes     Radiology:  Imaging Results          X-Ray Chest AP Portable (In process)                CT Head Without Contrast (Final result)  Result time 08/19/20 20:43:00    Final result by Td Dunn MD (08/19/20 20:43:00)                 Narrative:    EXAM DESCRIPTION: CT HEAD WITHOUT CONTRAST 8/19/2020 9:25 PM CDT    CLINICAL HISTORY: 64 years, Male, Syncope, simple, normal neuro exam; Pt. Passed out at home while sitting; did not fall; no trauma    COMPARISON: None.    FINDINGS:    Multiple transaxial tomograms of the brain were obtained from the base of the skull to the vertex without contrast.  An individualized dose optimization technique, Automated Exposure Control, was utilized for the performed procedure.    Brain parenchyma as well as the gray and white matter differentiation demonstrate to be within normal limits. There is mild prominence of the sulci and gyri are corresponding to mild brain atrophy with minimal periventricular white matter changes. There is no midline shift and/or mass effect. There is no evidence for acute hemorrhage.  Lateral ventricles and cisterns displace normal appearance.  No intra or extra axial fluid collections were seen. The calvarium is intact. The visualized portions of the paranasal sinuses and orbits demonstrate to be clear.    IMPRESSION:    MILD BRAIN ATROPHY WHICH IS APPROPRIATE FOR PATIENT'S AGE. NO ACUTE INTRACRANIAL HEMORRHAGE.    Electronically signed by:  Td Dunn MD  8/19/2020 9:28 PM CDT Workstation: [id number]                                    Assessment/plan:     David Dunn is a 64 y.o. male with:    Active Hospital Problems    Diagnosis  POA    *Syncope [R55]  Yes    Hypertension [I10]  Yes    Hyperlipemia [E78.5]  Yes    CKD (chronic kidney disease) stage 3, GFR 30-59 ml/min [N18.3]  Yes        - check orthostatics  - CUS  - echo  - monitor on tele  - trend troponins  - cont most home meds, hold hctz for now        Rickey Abrams

## 2020-08-20 NOTE — PLAN OF CARE
Pt assessment was completed at bedside. Pt was accompanied w/ his wife during assessment Pt is  and  has 3 adult children. Pt has not addressed living will or advanced directives. Pt currently lives w/ spouse and kids. Pt's primary care provider is . Pt intends to d/c home w/ family and no supports needed. Pt reports he gets his prescriptions filled at [facility] in [facility] [place]. Pt 's insurance is [employer] via [place]. Case management to continue to follow.        08/20/20 8152   Discharge Assessment   Assessment Type Discharge Planning Assessment   Confirmed/corrected address and phone number on facesheet? Yes   Assessment information obtained from? Patient   Communicated expected length of stay with patient/caregiver no   Prior to hospitilization cognitive status: Alert/Oriented   Prior to hospitalization functional status: Independent   Current cognitive status: Alert/Oriented   Current Functional Status: Independent   Facility Arrived From: Home   Lives With spouse   Able to Return to Prior Arrangements yes   Is patient able to care for self after discharge? Yes   Who are your caregiver(s) and their phone number(s)? Nataliya Walls wife [phone]   Patient's perception of discharge disposition home or selfcare   Readmission Within the Last 30 Days no previous admission in last 30 days   Patient currently being followed by outpatient case management? No   Patient currently receives any other outside agency services? No   Equipment Currently Used at Home none   Part D Coverage n/a   Do you have any problems affording any of your prescribed medications? No   Is the patient taking medications as prescribed? yes   Does the patient have transportation home? Yes   Transportation Anticipated family or friend will provide   Dialysis Name and Scheduled days n/a   Does the patient receive services at the [facility]? No   Discharge Plan A Home with family   Discharge Plan B Home with family   DME Needed  Upon Discharge  none   Patient/Family in Agreement with Plan yes

## 2020-08-20 NOTE — HPI
"David Dunn is a 64 y.o. male who  has a past medical history of Hyperlipemia and Hypertension.. The patient presented to the on 8/19/2020 with a primary complaint of Loss of Consciousness (Pt states suddenly didnt feel well and next thing he knew his wife was standing over him.)    He was feeling normal today until this evening when, while talking with his wife sitting upright in a chair, he suddenly started to feel lightheaded and like "all the blood drained out" of his head. His wife says he became unresponsive and apneic for 5-10 seconds and then spontaneously came to. He says he was a little confused for maybe 30 seconds after he regained consciousness. He says he felt a transient few seconds of chest discomfort like tightness after the episode.   Of note, he was started on HCTZ about a week ago per his cardiologist, Dr. Cruz.   Denied any incontinence, convulsions, vision changes, palpitations, sob, n/v/d, abd pain, diaphoresis.      "

## 2020-08-20 NOTE — ED PROVIDER NOTES
Encounter Date: 2020       History     Chief Complaint   Patient presents with    Loss of Consciousness     Pt states suddenly didnt feel well and next thing he knew his wife was standing over him.     Chief complaint is syncopal episode.  The patient was sitting down in his garage.  He then told his wife I do not feel right.  He then lost consciousness.  His wife noticed and be diaphoretic and not breathing.  She did not do mouth-to-mouth or do CPR.  She yelled at him and shook him and he woke up and after a few minutes was back to normal baseline.  No obvious neurological abnormalities during this episode.  At the present time he is awake alert cooperative not diaphoretic without complaints.  No complaints of fever chills earache sore throat chest pain pleuritic pain vomiting diarrhea trouble urinating or any other problems recently.    Past medical history heart disease hypertension.  No diabetes.  He does have high cholesterol.  He is a smoker quit 10 years ago.  The patient states that he had an angiogram have small blockage 15 years ago.  No recent angiogram done.  He is a patient Dr. Cruz.  He does have chronic trouble with trouble urinating.  He states he has been having some dizzy spells the last week or so.  He has been started on a new medicine a diuretic.    Family history mom  of kidney failure dad  of cancer.        Review of patient's allergies indicates:  No Known Allergies  Past Medical History:   Diagnosis Date    Hyperlipemia     Hypertension      No past surgical history on file.  No family history on file.  Social History     Tobacco Use    Smoking status: Never Smoker   Substance Use Topics    Alcohol use: Never     Frequency: Never    Drug use: Not on file     Review of Systems   Constitutional: Negative for chills and fever.   HENT: Negative for ear pain, rhinorrhea and sore throat.    Eyes: Negative for pain and visual disturbance.   Respiratory: Negative for cough  and shortness of breath.    Cardiovascular: Negative for chest pain and palpitations.   Gastrointestinal: Negative for abdominal pain, constipation, diarrhea, nausea and vomiting.   Genitourinary: Negative for dysuria, frequency, hematuria and urgency.   Musculoskeletal: Negative for back pain, joint swelling and myalgias.   Skin: Negative for rash.   Neurological: Positive for syncope. Negative for dizziness, seizures, weakness and headaches.   Psychiatric/Behavioral: Negative for dysphoric mood. The patient is not nervous/anxious.        Physical Exam     Initial Vitals [08/19/20 2027]   BP Pulse Resp Temp SpO2   100/71 75 16 97.6 °F (36.4 °C) 99 %      MAP       --         Physical Exam    Nursing note and vitals reviewed.  Constitutional: He appears well-developed and well-nourished.   HENT:   Head: Normocephalic and atraumatic.   Eyes: Conjunctivae, EOM and lids are normal. Pupils are equal, round, and reactive to light.   Neck: Trachea normal. Neck supple. No thyroid mass present.   Cardiovascular: Normal rate, regular rhythm and normal heart sounds.   Pulmonary/Chest: Breath sounds normal. No respiratory distress.   Abdominal: Soft. Bowel sounds are normal. There is no abdominal tenderness.   Musculoskeletal: Normal range of motion.   Neurological: He is alert and oriented to person, place, and time. He has normal strength and normal reflexes. No cranial nerve deficit or sensory deficit.   Skin: Skin is warm and dry.   Psychiatric: He has a normal mood and affect. His speech is normal and behavior is normal. Judgment and thought content normal.         ED Course   Procedures  Labs Reviewed - No data to display  EKG Readings: (Independently Interpreted)   Heart rate 57 sinus bradycardia.  No ST elevation NE interval normal.       Imaging Results    None          Medical Decision Making:   ED Management:  Patient labs reviewed.  Patient is stable.  Patient seen by hospitalist admitted.                                  Clinical Impression:       ICD-10-CM ICD-9-CM   1. Altered mental status  R41.82 780.97   2. Chest pain  R07.9 786.50                                Rachid Gomez MD  08/19/20 1870

## 2020-08-20 NOTE — DISCHARGE SUMMARY
"Critical access hospital Medicine  Discharge Summary      Patient Name: David Dunn  MRN: [id number]  Admission Date: 8/19/2020  Hospital Length of Stay: 0 days  Discharge Date and Time:  08/20/2020 2:41 PM  Attending Physician: Eleazar Grimes MD   Discharging Provider: Eleazar Grimes MD  Primary Care Provider: Td Lawrence MD      HPI:   David Dunn is a 64 y.o. male who  has a past medical history of Hyperlipemia and Hypertension.. The patient presented to the on 8/19/2020 with a primary complaint of Loss of Consciousness (Pt states suddenly didnt feel well and next thing he knew his wife was standing over him.)    He was feeling normal today until this evening when, while talking with his wife sitting upright in a chair, he suddenly started to feel lightheaded and like "all the blood drained out" of his head. His wife says he became unresponsive and apneic for 5-10 seconds and then spontaneously came to. He says he was a little confused for maybe 30 seconds after he regained consciousness. He says he felt a transient few seconds of chest discomfort like tightness after the episode.   Of note, he was started on HCTZ about a week ago per his cardiologist, Dr. Cruz.   Denied any incontinence, convulsions, vision changes, palpitations, sob, n/v/d, abd pain, diaphoresis.        * No surgery found *      Hospital Course:   8/20/2020  Mr Dunn notes that he began feeling bad( weak/dizzy) after starting HCTZ. I feel great today and have been able to walk the halls without near syncope, weakness or dizziness.  I have recommended that he discontinue HCTZ and hydrate adequately. Limit alcohol  His echocardiogram demonstrates no wall motion abnormalities but diastolic dysfunction and HECTOR. I have referred him to Dr Enriquez/cardiology  ROS-negative PE-normal save for an S 4 , no neurologic deficits      Consults:   Consults (From admission, onward)        Status Ordering Provider     Inpatient " consult to Hospitalist  Once     Provider:  Wilma Abrams MD    Acknowledged WILMA ABRAMS.          No new Assessment & Plan notes have been filed under this hospital service since the last note was generated.  Service: Hospital Medicine    Final Active Diagnoses:    Diagnosis Date Noted POA    PRINCIPAL PROBLEM:  Syncope [R55]  Yes    Hypertension [I10]  Yes    Hyperlipemia [E78.5]  Yes    CKD (chronic kidney disease) stage 3, GFR 30-59 ml/min [N18.3]  Yes      Problems Resolved During this Admission:       Discharged Condition: good    Disposition: Home or Self Care    Follow Up:  Follow-up Information     Td Lawrence MD In 1 week.    Specialty: Internal Medicine  Contact information:  [address]  [address]  [place] [address]  [phone]             Tran Enriquez MD In 2 weeks.    Specialty: Cardiology  Contact information:  [address]  [address]  [place] [address]  [phone]                 Patient Instructions:      Diet Cardiac     Notify your health care provider if you experience any of the following:  temperature >100.4     Notify your health care provider if you experience any of the following:  persistent nausea and vomiting or diarrhea     Notify your health care provider if you experience any of the following:  severe uncontrolled pain     Notify your health care provider if you experience any of the following:  redness, tenderness, or signs of infection (pain, swelling, redness, odor or green/yellow discharge around incision site)     Notify your health care provider if you experience any of the following:  difficulty breathing or increased cough     Notify your health care provider if you experience any of the following:  severe persistent headache     Notify your health care provider if you experience any of the following:  worsening rash     Notify your health care provider if you experience any of the following:  persistent dizziness, light-headedness, or visual  disturbances     Notify your health care provider if you experience any of the following:  increased confusion or weakness     Activity as tolerated       Significant Diagnostic Studies: Labs:   BMP:   Recent Labs   Lab 08/19/20 2124 08/20/20  0636    99    140   K 3.9 4.0    103   CO2 29 28   BUN 23 20   CREATININE 1.6* 1.3   CALCIUM 9.9 9.7   MG  --  2.1   , CMP   Recent Labs   Lab 08/19/20 2124 08/20/20  0636    140   K 3.9 4.0    103   CO2 29 28    99   BUN 23 20   CREATININE 1.6* 1.3   CALCIUM 9.9 9.7   PROT 7.5  --    ALBUMIN 4.4  --    BILITOT 0.7  --    ALKPHOS 61  --    AST 43*  --    ALT 50*  --    ANIONGAP 11 9   ESTGFRAFRICA 52.6* >60.0   EGFRNONAA 45.5* 58.5*   , CBC   Recent Labs   Lab 08/19/20 2124 08/20/20  0636   WBC 10.59 7.83   HGB 14.3 13.8*   HCT 43.8 42.1    294   , INR No results found for: INR, PROTIME, Lipid Panel No results found for: CHOL, HDL, LDLCALC, TRIG, CHOLHDL, Troponin   Recent Labs   Lab 08/20/20  0636   TROPONINI <0.030    and All labs within the past 24 hours have been reviewed    Pending Diagnostic Studies:     None         Medications:  Reconciled Home Medications:      Medication List      CONTINUE taking these medications    amLODIPine 5 MG tablet  Commonly known as: NORVASC  Take 5 mg by mouth once daily.     BYSTOLIC 10 MG Tab  Generic drug: nebivoloL  Take 10 mg by mouth once daily.     ezetimibe 10 mg tablet  Commonly known as: ZETIA  Take 10 mg by mouth once daily.     rosuvastatin 40 MG Tab  Commonly known as: CRESTOR  Take 10 mg by mouth every evening.     telmisartan 80 MG Tab  Commonly known as: MICARDIS  Take 40 mg by mouth once daily.        STOP taking these medications    hydroCHLOROthiazide 12.5 MG Tab  Commonly known as: HYDRODIURIL            Indwelling Lines/Drains at time of discharge:   Lines/Drains/Airways     None                 Time spent on the discharge of patient: 18 minutes  Patient was seen and  examined on the date of discharge and determined to be suitable for discharge.         Eleazar Grimes MD  Department of Hospital Medicine  [facility]

## 2020-08-20 NOTE — HOSPITAL COURSE
8/20/2020  Mr Dunn notes that he began feeling bad( weak/dizzy) after starting HCTZ. I feel great today and have been able to walk the halls without near syncope, weakness or dizziness.  I have recommended that he discontinue HCTZ and hydrate adequately. Limit alcohol  His echocardiogram demonstrates no wall motion abnormalities but diastolic dysfunction and HECTOR. I have referred him to Dr Enriquez/cardiology  ROS-negative PE-normal save for an S 4 , no neurologic deficits

## 2020-08-28 DIAGNOSIS — I10 ESSENTIAL HYPERTENSION: Primary | ICD-10-CM

## 2020-09-28 ENCOUNTER — HOSPITAL ENCOUNTER (OUTPATIENT)
Dept: CARDIOLOGY | Facility: HOSPITAL | Age: 65
Discharge: HOME | End: 2020-09-28
Attending: SPECIALIST
Payer: COMMERCIAL

## 2020-09-28 DIAGNOSIS — R55 SYNCOPE AND COLLAPSE: ICD-10-CM

## 2020-09-28 PROCEDURE — 93271 ECG/MONITORING AND ANALYSIS: CPT

## 2020-10-27 ENCOUNTER — HOSPITAL ENCOUNTER (INPATIENT)
Facility: HOSPITAL | Age: 65
LOS: 5 days | Discharge: HOME | DRG: 178 | End: 2020-11-01
Attending: EMERGENCY MEDICINE | Admitting: INTERNAL MEDICINE
Payer: OTHER GOVERNMENT

## 2020-10-27 DIAGNOSIS — G47.33 OSA (OBSTRUCTIVE SLEEP APNEA): ICD-10-CM

## 2020-10-27 DIAGNOSIS — R09.02 HYPOXIA: ICD-10-CM

## 2020-10-27 DIAGNOSIS — Z20.822 SUSPECTED COVID-19 VIRUS INFECTION: ICD-10-CM

## 2020-10-27 DIAGNOSIS — U07.1 COVID-19: Primary | ICD-10-CM

## 2020-10-27 DIAGNOSIS — I10 HYPERTENSION, UNSPECIFIED TYPE: ICD-10-CM

## 2020-10-27 DIAGNOSIS — R50.9 FEVER: ICD-10-CM

## 2020-10-27 DIAGNOSIS — U07.1 COVID-19 VIRUS INFECTION: ICD-10-CM

## 2020-10-27 LAB
ALBUMIN SERPL BCP-MCNC: 4.5 G/DL (ref 3.5–5.2)
ALP SERPL-CCNC: 66 U/L (ref 55–135)
ALT SERPL W/O P-5'-P-CCNC: 70 U/L (ref 10–44)
ANION GAP SERPL CALC-SCNC: 12 MMOL/L (ref 8–16)
AST SERPL-CCNC: 52 U/L (ref 10–40)
BACTERIA #/AREA URNS HPF: NEGATIVE /HPF
BASOPHILS # BLD AUTO: 0.05 K/UL (ref 0–0.2)
BASOPHILS NFR BLD: 0.7 % (ref 0–1.9)
BILIRUB SERPL-MCNC: 0.7 MG/DL (ref 0.1–1)
BILIRUB UR QL STRIP: NEGATIVE
BUN SERPL-MCNC: 22 MG/DL (ref 8–23)
CALCIUM SERPL-MCNC: 9.7 MG/DL (ref 8.7–10.5)
CHLORIDE SERPL-SCNC: 98 MMOL/L (ref 95–110)
CK SERPL-CCNC: 91 U/L (ref 20–200)
CLARITY UR: CLEAR
CO2 SERPL-SCNC: 24 MMOL/L (ref 23–29)
COLOR UR: YELLOW
CREAT SERPL-MCNC: 1.2 MG/DL (ref 0.5–1.4)
CRP SERPL-MCNC: 0.09 MG/DL
CRP SERPL-MCNC: 0.14 MG/DL
D DIMER PPP IA.FEU-MCNC: 0.44 UG/ML FEU
DIFFERENTIAL METHOD: ABNORMAL
EOSINOPHIL # BLD AUTO: 0 K/UL (ref 0–0.5)
EOSINOPHIL NFR BLD: 0 % (ref 0–8)
ERYTHROCYTE [DISTWIDTH] IN BLOOD BY AUTOMATED COUNT: 12.5 % (ref 11.5–14.5)
ERYTHROCYTE [SEDIMENTATION RATE] IN BLOOD BY WESTERGREN METHOD: 12 MM/HR (ref 0–10)
EST. GFR  (AFRICAN AMERICAN): >60 ML/MIN/1.73 M^2
EST. GFR  (NON AFRICAN AMERICAN): >60 ML/MIN/1.73 M^2
FERRITIN SERPL-MCNC: 477 NG/ML (ref 20–300)
GLUCOSE SERPL-MCNC: 104 MG/DL (ref 70–110)
GLUCOSE UR QL STRIP: NEGATIVE
HCT VFR BLD AUTO: 50 % (ref 40–54)
HGB BLD-MCNC: 16.1 G/DL (ref 14–18)
HGB UR QL STRIP: NEGATIVE
HYALINE CASTS #/AREA URNS LPF: 10 /LPF
IMM GRANULOCYTES # BLD AUTO: 0.05 K/UL (ref 0–0.04)
IMM GRANULOCYTES NFR BLD AUTO: 0.7 % (ref 0–0.5)
KETONES UR QL STRIP: NEGATIVE
LACTATE SERPL-SCNC: 1.7 MMOL/L (ref 0.5–1.9)
LDH SERPL L TO P-CCNC: 189 U/L (ref 110–260)
LEUKOCYTE ESTERASE UR QL STRIP: NEGATIVE
LYMPHOCYTES # BLD AUTO: 1.1 K/UL (ref 1–4.8)
LYMPHOCYTES NFR BLD: 15.1 % (ref 18–48)
MCH RBC QN AUTO: 30.3 PG (ref 27–31)
MCHC RBC AUTO-ENTMCNC: 32.2 G/DL (ref 32–36)
MCV RBC AUTO: 94 FL (ref 82–98)
MICROSCOPIC COMMENT: ABNORMAL
MONOCYTES # BLD AUTO: 0.8 K/UL (ref 0.3–1)
MONOCYTES NFR BLD: 10.7 % (ref 4–15)
NEUTROPHILS # BLD AUTO: 5.5 K/UL (ref 1.8–7.7)
NEUTROPHILS NFR BLD: 72.8 % (ref 38–73)
NITRITE UR QL STRIP: NEGATIVE
NRBC BLD-RTO: 0 /100 WBC
PH UR STRIP: 6 [PH] (ref 5–8)
PLATELET # BLD AUTO: 242 K/UL (ref 150–350)
PMV BLD AUTO: 9.6 FL (ref 9.2–12.9)
POTASSIUM SERPL-SCNC: 5 MMOL/L (ref 3.5–5.1)
PROCALCITONIN SERPL IA-MCNC: <0.05 NG/ML (ref 0–0.5)
PROT SERPL-MCNC: 8.1 G/DL (ref 6–8.4)
PROT UR QL STRIP: ABNORMAL
RBC # BLD AUTO: 5.31 M/UL (ref 4.6–6.2)
RBC #/AREA URNS HPF: 5 /HPF (ref 0–4)
SARS-COV-2 RDRP RESP QL NAA+PROBE: POSITIVE
SODIUM SERPL-SCNC: 134 MMOL/L (ref 136–145)
SP GR UR STRIP: 1.02 (ref 1–1.03)
SQUAMOUS #/AREA URNS HPF: 2 /HPF
TROPONIN I SERPL DL<=0.01 NG/ML-MCNC: <0.03 NG/ML
URN SPEC COLLECT METH UR: ABNORMAL
UROBILINOGEN UR STRIP-ACNC: NEGATIVE EU/DL
WBC # BLD AUTO: 7.55 K/UL (ref 3.9–12.7)
WBC #/AREA URNS HPF: 2 /HPF (ref 0–5)

## 2020-10-27 PROCEDURE — 82728 ASSAY OF FERRITIN: CPT

## 2020-10-27 PROCEDURE — 99285 EMERGENCY DEPT VISIT HI MDM: CPT | Mod: 25

## 2020-10-27 PROCEDURE — 80053 COMPREHEN METABOLIC PANEL: CPT

## 2020-10-27 PROCEDURE — 86140 C-REACTIVE PROTEIN: CPT

## 2020-10-27 PROCEDURE — 83605 ASSAY OF LACTIC ACID: CPT

## 2020-10-27 PROCEDURE — 84484 ASSAY OF TROPONIN QUANT: CPT

## 2020-10-27 PROCEDURE — 36415 COLL VENOUS BLD VENIPUNCTURE: CPT

## 2020-10-27 PROCEDURE — 85651 RBC SED RATE NONAUTOMATED: CPT

## 2020-10-27 PROCEDURE — 83615 LACTATE (LD) (LDH) ENZYME: CPT

## 2020-10-27 PROCEDURE — 93010 EKG 12-LEAD: ICD-10-PCS | Mod: ,,, | Performed by: INTERNAL MEDICINE

## 2020-10-27 PROCEDURE — 84145 PROCALCITONIN (PCT): CPT

## 2020-10-27 PROCEDURE — 21400001 HC TELEMETRY ROOM

## 2020-10-27 PROCEDURE — 93010 ELECTROCARDIOGRAM REPORT: CPT | Mod: ,,, | Performed by: INTERNAL MEDICINE

## 2020-10-27 PROCEDURE — 25000003 PHARM REV CODE 250: Performed by: NURSE PRACTITIONER

## 2020-10-27 PROCEDURE — 82550 ASSAY OF CK (CPK): CPT

## 2020-10-27 PROCEDURE — 81001 URINALYSIS AUTO W/SCOPE: CPT

## 2020-10-27 PROCEDURE — 85379 FIBRIN DEGRADATION QUANT: CPT

## 2020-10-27 PROCEDURE — U0002 COVID-19 LAB TEST NON-CDC: HCPCS

## 2020-10-27 PROCEDURE — 93005 ELECTROCARDIOGRAM TRACING: CPT | Performed by: INTERNAL MEDICINE

## 2020-10-27 PROCEDURE — 85025 COMPLETE CBC W/AUTO DIFF WBC: CPT

## 2020-10-27 PROCEDURE — 87040 BLOOD CULTURE FOR BACTERIA: CPT

## 2020-10-27 PROCEDURE — 86140 C-REACTIVE PROTEIN: CPT | Mod: 91

## 2020-10-27 PROCEDURE — 63600175 PHARM REV CODE 636 W HCPCS: Performed by: NURSE PRACTITIONER

## 2020-10-27 RX ORDER — ALBUTEROL SULFATE 90 UG/1
2 AEROSOL, METERED RESPIRATORY (INHALATION) EVERY 6 HOURS
Status: DISCONTINUED | OUTPATIENT
Start: 2020-10-28 | End: 2020-10-30

## 2020-10-27 RX ORDER — TELMISARTAN 40 MG/1
40 TABLET ORAL DAILY
Status: DISCONTINUED | OUTPATIENT
Start: 2020-10-28 | End: 2020-11-01 | Stop reason: HOSPADM

## 2020-10-27 RX ORDER — AMLODIPINE BESYLATE 5 MG/1
5 TABLET ORAL DAILY
Status: DISCONTINUED | OUTPATIENT
Start: 2020-10-28 | End: 2020-10-28

## 2020-10-27 RX ORDER — ACETAMINOPHEN 325 MG/1
650 TABLET ORAL
Status: COMPLETED | OUTPATIENT
Start: 2020-10-27 | End: 2020-10-27

## 2020-10-27 RX ORDER — GLUCAGON 1 MG
1 KIT INJECTION
Status: DISCONTINUED | OUTPATIENT
Start: 2020-10-27 | End: 2020-11-01 | Stop reason: HOSPADM

## 2020-10-27 RX ORDER — AMLODIPINE BESYLATE 10 MG/1
10 TABLET ORAL NIGHTLY
COMMUNITY

## 2020-10-27 RX ORDER — LANOLIN ALCOHOL/MO/W.PET/CERES
800 CREAM (GRAM) TOPICAL
Status: DISCONTINUED | OUTPATIENT
Start: 2020-10-27 | End: 2020-11-01 | Stop reason: HOSPADM

## 2020-10-27 RX ORDER — ASCORBIC ACID 500 MG
500 TABLET ORAL 2 TIMES DAILY
Status: DISCONTINUED | OUTPATIENT
Start: 2020-10-27 | End: 2020-10-28

## 2020-10-27 RX ORDER — ENOXAPARIN SODIUM 100 MG/ML
40 INJECTION SUBCUTANEOUS EVERY 24 HOURS
Status: DISCONTINUED | OUTPATIENT
Start: 2020-10-27 | End: 2020-11-01 | Stop reason: HOSPADM

## 2020-10-27 RX ORDER — NEBIVOLOL 10 MG/1
10 TABLET ORAL DAILY
Status: DISCONTINUED | OUTPATIENT
Start: 2020-10-28 | End: 2020-11-01 | Stop reason: HOSPADM

## 2020-10-27 RX ORDER — HYDROCODONE BITARTRATE AND ACETAMINOPHEN 5; 325 MG/1; MG/1
1 TABLET ORAL EVERY 6 HOURS PRN
Status: DISCONTINUED | OUTPATIENT
Start: 2020-10-27 | End: 2020-11-01 | Stop reason: HOSPADM

## 2020-10-27 RX ORDER — IBUPROFEN 200 MG
16 TABLET ORAL
Status: DISCONTINUED | OUTPATIENT
Start: 2020-10-27 | End: 2020-11-01 | Stop reason: HOSPADM

## 2020-10-27 RX ORDER — CHOLECALCIFEROL (VITAMIN D3) 25 MCG
1000 TABLET ORAL DAILY
Status: DISCONTINUED | OUTPATIENT
Start: 2020-10-28 | End: 2020-10-28

## 2020-10-27 RX ORDER — GUAIFENESIN/DEXTROMETHORPHAN 100-10MG/5
10 SYRUP ORAL EVERY 4 HOURS PRN
Status: DISCONTINUED | OUTPATIENT
Start: 2020-10-27 | End: 2020-11-01 | Stop reason: HOSPADM

## 2020-10-27 RX ORDER — IBUPROFEN 200 MG
24 TABLET ORAL
Status: DISCONTINUED | OUTPATIENT
Start: 2020-10-27 | End: 2020-11-01 | Stop reason: HOSPADM

## 2020-10-27 RX ORDER — TALC
6 POWDER (GRAM) TOPICAL NIGHTLY PRN
Status: DISCONTINUED | OUTPATIENT
Start: 2020-10-27 | End: 2020-11-01 | Stop reason: HOSPADM

## 2020-10-27 RX ORDER — ENOXAPARIN SODIUM 100 MG/ML
30 INJECTION SUBCUTANEOUS EVERY 24 HOURS
Status: DISCONTINUED | OUTPATIENT
Start: 2020-10-27 | End: 2020-10-27

## 2020-10-27 RX ORDER — ACETAMINOPHEN 325 MG/1
650 TABLET ORAL EVERY 8 HOURS PRN
Status: DISCONTINUED | OUTPATIENT
Start: 2020-10-27 | End: 2020-11-01 | Stop reason: HOSPADM

## 2020-10-27 RX ORDER — ONDANSETRON 2 MG/ML
4 INJECTION INTRAMUSCULAR; INTRAVENOUS EVERY 8 HOURS PRN
Status: DISCONTINUED | OUTPATIENT
Start: 2020-10-27 | End: 2020-11-01 | Stop reason: HOSPADM

## 2020-10-27 RX ORDER — SODIUM CHLORIDE 0.9 % (FLUSH) 0.9 %
10 SYRINGE (ML) INJECTION
Status: DISCONTINUED | OUTPATIENT
Start: 2020-10-27 | End: 2020-11-01 | Stop reason: HOSPADM

## 2020-10-27 RX ADMIN — OXYCODONE HYDROCHLORIDE AND ACETAMINOPHEN 500 MG: 500 TABLET ORAL at 10:10

## 2020-10-27 RX ADMIN — ENOXAPARIN SODIUM 40 MG: 40 INJECTION SUBCUTANEOUS at 10:10

## 2020-10-27 RX ADMIN — ACETAMINOPHEN 325 MG: 325 TABLET, FILM COATED ORAL at 10:10

## 2020-10-27 RX ADMIN — HYDROCODONE BITARTRATE AND ACETAMINOPHEN 1 TABLET: 5; 325 TABLET ORAL at 10:10

## 2020-10-27 RX ADMIN — MELATONIN 6 MG: at 10:10

## 2020-10-27 RX ADMIN — ACETAMINOPHEN 650 MG: 325 TABLET ORAL at 03:10

## 2020-10-28 LAB
ABO + RH BLD: NORMAL
ALBUMIN SERPL BCP-MCNC: 3.7 G/DL (ref 3.5–5.2)
ALP SERPL-CCNC: 54 U/L (ref 55–135)
ALT SERPL W/O P-5'-P-CCNC: 70 U/L (ref 10–44)
ANION GAP SERPL CALC-SCNC: 9 MMOL/L (ref 8–16)
AST SERPL-CCNC: 50 U/L (ref 10–40)
BASOPHILS # BLD AUTO: 0.07 K/UL (ref 0–0.2)
BASOPHILS NFR BLD: 1 % (ref 0–1.9)
BILIRUB SERPL-MCNC: 0.8 MG/DL (ref 0.1–1)
BLD GP AB SCN CELLS X3 SERPL QL: NORMAL
BLD PROD TYP BPU: NORMAL
BLD PROD TYP BPU: NORMAL
BLOOD UNIT EXPIRATION DATE: NORMAL
BLOOD UNIT EXPIRATION DATE: NORMAL
BLOOD UNIT TYPE CODE: 600
BLOOD UNIT TYPE CODE: 600
BLOOD UNIT TYPE: NORMAL
BLOOD UNIT TYPE: NORMAL
BUN SERPL-MCNC: 22 MG/DL (ref 8–23)
CALCIUM SERPL-MCNC: 8.9 MG/DL (ref 8.7–10.5)
CHLORIDE SERPL-SCNC: 101 MMOL/L (ref 95–110)
CO2 SERPL-SCNC: 24 MMOL/L (ref 23–29)
CODING SYSTEM: NORMAL
CODING SYSTEM: NORMAL
CREAT SERPL-MCNC: 1.2 MG/DL (ref 0.5–1.4)
DIFFERENTIAL METHOD: ABNORMAL
DISPENSE STATUS: NORMAL
DISPENSE STATUS: NORMAL
EOSINOPHIL # BLD AUTO: 0 K/UL (ref 0–0.5)
EOSINOPHIL NFR BLD: 0.1 % (ref 0–8)
ERYTHROCYTE [DISTWIDTH] IN BLOOD BY AUTOMATED COUNT: 12.4 % (ref 11.5–14.5)
EST. GFR  (AFRICAN AMERICAN): >60 ML/MIN/1.73 M^2
EST. GFR  (NON AFRICAN AMERICAN): >60 ML/MIN/1.73 M^2
GLUCOSE SERPL-MCNC: 92 MG/DL (ref 70–110)
HCT VFR BLD AUTO: 44.8 % (ref 40–54)
HGB BLD-MCNC: 14.2 G/DL (ref 14–18)
IMM GRANULOCYTES # BLD AUTO: 0.05 K/UL (ref 0–0.04)
IMM GRANULOCYTES NFR BLD AUTO: 0.7 % (ref 0–0.5)
LYMPHOCYTES # BLD AUTO: 1.8 K/UL (ref 1–4.8)
LYMPHOCYTES NFR BLD: 26.9 % (ref 18–48)
MAGNESIUM SERPL-MCNC: 2 MG/DL (ref 1.6–2.6)
MCH RBC QN AUTO: 29.2 PG (ref 27–31)
MCHC RBC AUTO-ENTMCNC: 31.7 G/DL (ref 32–36)
MCV RBC AUTO: 92 FL (ref 82–98)
MONOCYTES # BLD AUTO: 0.8 K/UL (ref 0.3–1)
MONOCYTES NFR BLD: 11.5 % (ref 4–15)
NEUTROPHILS # BLD AUTO: 4 K/UL (ref 1.8–7.7)
NEUTROPHILS NFR BLD: 59.8 % (ref 38–73)
NRBC BLD-RTO: 0 /100 WBC
PHOSPHATE SERPL-MCNC: 4 MG/DL (ref 2.7–4.5)
PLATELET # BLD AUTO: 221 K/UL (ref 150–350)
PMV BLD AUTO: 9.7 FL (ref 9.2–12.9)
POTASSIUM SERPL-SCNC: 4.6 MMOL/L (ref 3.5–5.1)
PROT SERPL-MCNC: 6.9 G/DL (ref 6–8.4)
RBC # BLD AUTO: 4.86 M/UL (ref 4.6–6.2)
SODIUM SERPL-SCNC: 134 MMOL/L (ref 136–145)
UNIT NUMBER: NORMAL
UNIT NUMBER: NORMAL
WBC # BLD AUTO: 6.72 K/UL (ref 3.9–12.7)

## 2020-10-28 PROCEDURE — 84100 ASSAY OF PHOSPHORUS: CPT

## 2020-10-28 PROCEDURE — 63600175 PHARM REV CODE 636 W HCPCS: Performed by: NURSE PRACTITIONER

## 2020-10-28 PROCEDURE — 94799 UNLISTED PULMONARY SVC/PX: CPT

## 2020-10-28 PROCEDURE — 27000221 HC OXYGEN, UP TO 24 HOURS

## 2020-10-28 PROCEDURE — 86850 RBC ANTIBODY SCREEN: CPT

## 2020-10-28 PROCEDURE — 80053 COMPREHEN METABOLIC PANEL: CPT

## 2020-10-28 PROCEDURE — 83735 ASSAY OF MAGNESIUM: CPT

## 2020-10-28 PROCEDURE — 94761 N-INVAS EAR/PLS OXIMETRY MLT: CPT

## 2020-10-28 PROCEDURE — 99900035 HC TECH TIME PER 15 MIN (STAT)

## 2020-10-28 PROCEDURE — 36415 COLL VENOUS BLD VENIPUNCTURE: CPT

## 2020-10-28 PROCEDURE — 99222 1ST HOSP IP/OBS MODERATE 55: CPT | Mod: ,,, | Performed by: INTERNAL MEDICINE

## 2020-10-28 PROCEDURE — 25000003 PHARM REV CODE 250: Performed by: NURSE PRACTITIONER

## 2020-10-28 PROCEDURE — 25000003 PHARM REV CODE 250: Performed by: INTERNAL MEDICINE

## 2020-10-28 PROCEDURE — 25000242 PHARM REV CODE 250 ALT 637 W/ HCPCS: Performed by: NURSE PRACTITIONER

## 2020-10-28 PROCEDURE — 94640 AIRWAY INHALATION TREATMENT: CPT

## 2020-10-28 PROCEDURE — 85025 COMPLETE CBC W/AUTO DIFF WBC: CPT

## 2020-10-28 PROCEDURE — 21400001 HC TELEMETRY ROOM

## 2020-10-28 PROCEDURE — 99222 PR INITIAL HOSPITAL CARE,LEVL II: ICD-10-PCS | Mod: ,,, | Performed by: INTERNAL MEDICINE

## 2020-10-28 RX ORDER — MECLIZINE HCL 12.5 MG 12.5 MG/1
25 TABLET ORAL 3 TIMES DAILY PRN
Status: DISCONTINUED | OUTPATIENT
Start: 2020-10-28 | End: 2020-10-31

## 2020-10-28 RX ORDER — AMLODIPINE BESYLATE 5 MG/1
10 TABLET ORAL DAILY
Status: DISCONTINUED | OUTPATIENT
Start: 2020-10-29 | End: 2020-11-01 | Stop reason: HOSPADM

## 2020-10-28 RX ORDER — SODIUM CHLORIDE 9 MG/ML
INJECTION, SOLUTION INTRAVENOUS CONTINUOUS
Status: DISCONTINUED | OUTPATIENT
Start: 2020-10-28 | End: 2020-11-01 | Stop reason: HOSPADM

## 2020-10-28 RX ORDER — DIPHENHYDRAMINE HYDROCHLORIDE 50 MG/ML
25 INJECTION INTRAMUSCULAR; INTRAVENOUS
Status: DISCONTINUED | OUTPATIENT
Start: 2020-10-28 | End: 2020-11-01 | Stop reason: HOSPADM

## 2020-10-28 RX ORDER — HYDROCODONE BITARTRATE AND ACETAMINOPHEN 500; 5 MG/1; MG/1
TABLET ORAL
Status: DISCONTINUED | OUTPATIENT
Start: 2020-10-28 | End: 2020-11-01 | Stop reason: HOSPADM

## 2020-10-28 RX ORDER — ACETAMINOPHEN 500 MG
1000 TABLET ORAL
Status: DISCONTINUED | OUTPATIENT
Start: 2020-10-28 | End: 2020-11-01 | Stop reason: HOSPADM

## 2020-10-28 RX ADMIN — ALBUTEROL SULFATE 2 PUFF: 90 AEROSOL, METERED RESPIRATORY (INHALATION) at 12:10

## 2020-10-28 RX ADMIN — DEXAMETHASONE 6 MG: 2 TABLET ORAL at 08:10

## 2020-10-28 RX ADMIN — IPRATROPIUM BROMIDE 2 PUFF: 17 AEROSOL, METERED RESPIRATORY (INHALATION) at 12:10

## 2020-10-28 RX ADMIN — NEBIVOLOL HYDROCHLORIDE 10 MG: 10 TABLET ORAL at 08:10

## 2020-10-28 RX ADMIN — THERA TABS 1 TABLET: TAB at 08:10

## 2020-10-28 RX ADMIN — ALBUTEROL SULFATE 2 PUFF: 90 AEROSOL, METERED RESPIRATORY (INHALATION) at 07:10

## 2020-10-28 RX ADMIN — ACETAMINOPHEN 650 MG: 325 TABLET, FILM COATED ORAL at 07:10

## 2020-10-28 RX ADMIN — SODIUM CHLORIDE: 0.9 INJECTION, SOLUTION INTRAVENOUS at 08:10

## 2020-10-28 RX ADMIN — ENOXAPARIN SODIUM 40 MG: 40 INJECTION SUBCUTANEOUS at 04:10

## 2020-10-28 RX ADMIN — ALBUTEROL SULFATE 2 PUFF: 90 AEROSOL, METERED RESPIRATORY (INHALATION) at 08:10

## 2020-10-28 RX ADMIN — SODIUM CHLORIDE 200 MG: 0.9 INJECTION, SOLUTION INTRAVENOUS at 08:10

## 2020-10-28 RX ADMIN — TELMISARTAN 40 MG: 40 TABLET ORAL at 08:10

## 2020-10-28 NOTE — PLAN OF CARE
10/28/20 0726   Patient Assessment/Suction   Level of Consciousness (AVPU) alert   Respiratory Effort Unlabored;Normal   Expansion/Accessory Muscles/Retractions no use of accessory muscles;no retractions;expansion symmetric   All Lung Fields Breath Sounds clear   Rhythm/Pattern, Respiratory unlabored;pattern regular;depth regular;chest wiggle adequate;no shortness of breath reported   Cough Frequency infrequent   Cough Type nonproductive   PRE-TX-O2   O2 Device (Oxygen Therapy) nasal cannula   Flow (L/min) 3   SpO2 (!) 7 %   Pulse Oximetry Type Intermittent   $ Pulse Oximetry - Multiple Charge Pulse Oximetry - Multiple   Pulse 78   Resp (!) 189   Inhaler   $ Inhaler Charges MDI (Metered Dose Inahler) Treatment   Daily Review of Necessity (Inhaler) completed   Respiratory Treatment Status (Inhaler) given   Treatment Route (Inhaler) spacer/holding chamber;mouthpiece   Patient Position (Inhaler) semi-Dawkins's   Post Treatment Assessment (Inhaler) increased aeration   Signs of Intolerance (Inhaler) none   Respiratory Evaluation   $ Care Plan Tech Time 15 min   Evaluation For New Orders

## 2020-10-28 NOTE — PROGRESS NOTES
Consult Note  Infectious Disease    Reason for Consult:  COVID    HPI: David Dunn is a  64 y.o. male    presented with low oxygen sat as measured by his wife.   The patient states 10 days  he began to have generalized weakness and fatigue and low grade fever. He was seen by his primary care physician, diagnosed with COVID and given albuterol and medrol dose josh. He denies wheezing , chest pain, sputum production, though he has had cough.  He had transient improvement in temperature but fever escalated 3-4 days ago. He has not been eating or drinking well and today feel worse, with dizziness(not vertigo).  He is currently on 4 liters NC    COVID 19: 10/27rm  Procalcitonin: normal  Troponin:   BNP :  D-dimer: 0.44  LDH:  Triglycerides:  Ferritin: 477  azithromycin :   Remdesivir : 10/28  Convalescent plasma:10/28  Blood type:A positive  IL-6:  Tocilizumab:  Recent Labs   Lab 10/27/20  1543 10/27/20  2105   CRP 0.09 0.14         Review of patient's allergies indicates:  No Known Allergies  Past Medical History:   Diagnosis Date    CHF (congestive heart failure)     Coronary artery disease     COVID-19     Hyperlipemia     Hypertension     Renal disorder     ckd    Sleep apnea     cpap      Past Surgical History:   Procedure Laterality Date    APPENDECTOMY       Social History     Socioeconomic History    Marital status:      Spouse name: Not on file    Number of children: Not on file    Years of education: Not on file    Highest education level: Not on file   Occupational History    Not on file   Social Needs    Financial resource strain: Not on file    Food insecurity     Worry: Not on file     Inability: Not on file    Transportation needs     Medical: Not on file     Non-medical: Not on file   Tobacco Use    Smoking status: Former Smoker     Types: Cigars, Cigarettes     Start date:      Quit date:      Years since quittin.8    Smokeless tobacco: Never Used   Substance and  Sexual Activity    Alcohol use: Yes     Alcohol/week: 1.0 standard drinks     Types: 1 Glasses of wine per week     Frequency: Never    Drug use: Never    Sexual activity: Yes   Lifestyle    Physical activity     Days per week: Not on file     Minutes per session: Not on file    Stress: Not on file   Relationships    Social connections     Talks on phone: Not on file     Gets together: Not on file     Attends [employer] service: Not on file     Active member of club or organization: Not on file     Attends meetings of clubs or organizations: Not on file     Relationship status: Not on file   Other Topics Concern    Not on file   Social History Narrative    Not on file     History reviewed. No pertinent family history.    Pertinent medications noted:     Review of Systems:     chills, fever, sweats, weight loss  No change in vision, loss of vision or diplopia  No sinus congestion, purulent nasal discharge, post nasal drip or facial pain or stuffiness in ears  No pain in mouth or throat. No problems with teeth, gums.  No chest pain, palpitations, syncope    Cough, no sputum production, shortness of breath, dyspnea on exertion, pleurisy, hemoptysis    nausea, vomiting, diarrhea, without or focal abd pain,     No dysuria,   No swelling of joints, redness of joints, injuries, or new focal pain  frontal headaches, dizziness, without vertigo, numbness, paresthesias, neuropathy, falls  No anxiety, depression, substance abuse, sleep disturbance  No diabetes, thyroid, hypogonadal conditions  No bleeding, lymphadenopathy, anemia, malignancy, unusual bruising  No new rashes, lesions, or wounds       EXAM & DIAGNOSTICS REVIEWED:   Vitals:     Temp:  [98.8 °F (37.1 °C)-101.4 °F (38.6 °C)]   Temp: 98.8 °F (37.1 °C) (10/28/20 0718)  Pulse: 78 (10/28/20 0726)  Resp: (!) 189 (10/28/20 0726)  BP: (!) 150/89 (10/28/20 0718)  SpO2: (!) 7 % (10/28/20 0726)  No intake or output data in the 24 hours ending 10/28/20 0752    General:   Feels poorly, speaks softly, Alert and attentive, cooperative, uncomfortable  Eyes:  Anicteric, PERRL, EOMI  ENT:  No ulcers, exudates, thrush, nares patent, dentition is excellent  Neck:  supple, no masses or adenopathy appreciated  Lungs: Clear, no consolidation, rales, wheezes, rub  Heart:  RRR, no gallop/murmur/rub noted  Abd:  Soft, NT, ND, normal BS, no masses or organomegaly appreciated.  :  Voids  no flank tenderness  Musc:  Joints without effusion, swelling, erythema, synovitis, muscle wasting.   Skin:  No rashes. No palmar or plantar lesions. No subungual petechiae  Wound:   Neuro:              Alert, attentive, speech fluent, face symmetric, moves all extremities, no focal weakness. Ambulatory  Psych:  Calm, cooperative  Lymphatic:     No cervical, supraclavicular, axillary  nodes  Extrem: No edema, erythema, phlebitis, cellulitis, warm and well perfused  VAD:   peripheral    Isolation:  COVID    Lines/Tubes/Drains:    General Labs reviewed:  Recent Labs   Lab 10/27/20  1543 10/28/20  0338   WBC 7.55 6.72   HGB 16.1 14.2   HCT 50.0 44.8    221       Recent Labs   Lab 10/27/20  1543 10/28/20  0338   * 134*   K 5.0 4.6   CL 98 101   CO2 24 24   BUN 22 22   CREATININE 1.2 1.2   CALCIUM 9.7 8.9   PROT 8.1 6.9   BILITOT 0.7 0.8   ALKPHOS 66 54*   ALT 70* 70*   AST 52* 50*           Micro:  Microbiology Results (last 7 days)     Procedure Component Value Units Date/Time    Blood culture (site 2) [[id number]] Collected: 10/27/20 1543    Order Status: Completed Specimen: Blood from Peripheral, Antecubital, Right Updated: 10/28/20 0558     Blood Culture, Routine No Growth to date    Narrative:      Site # 2, aerobic only        Imaging Reviewed:   CXR      Cardiology:    IMPRESSION & PLAN   1. COVID 19 infection with hypoxemia, without pneumonia   Dizziness, ?volume depletion or eustachian elated  2. Hypertension  3. GE      Recommendations:  Additional liter of IVF, meclizine, prn  anti-emetic  Concur with remdesivir, day 1/5  Discussed COVID convalescent plasma and he consents  Trend CBC CMP while on remdesivir  Trend ferritin, D dimer     Medical Decision Making during this encounter was  [_] Low Complexity  [_] Moderate Complexity  [  ] High Complexity    The FDAs Convalescent Plasma Emergency Use Authorization Fact Sheet was provided to the patient and/or caregiver.  The patient and/or caregiver has been counseled that the FDA has authorized the emergency use of convalescent plasma which is not FDA approved. The significant known and potential risks and benefits of COVID-19 convalescent plasma and the extent to which such risks and benefits are unknown have been discussed with the patient and/or caregiver. The patient and/or caregiver has been given the option to accept or refuse and has verbally agreed to receive convalescent plasma. All questions and concerns were addressed.

## 2020-10-28 NOTE — H&P
[facility] Medicine History & Physical Examination   Patient Name: David Dunn  MRN: [id number]  Patient Class: IP- Inpatient   Admission Date: 10/27/2020  3:31 PM  Length of Stay: 0  Attending Physician:   Primary Care Provider: Td Lawrence MD  Face-to-Face encounter date: 10/27/2020  Code Status:Full Code  MPOA:  Chief Complaint: COVID POS (DX FRIDAY) and Chills        Patient information was obtained from patient, past medical records and ER records.   HISTORY OF PRESENT ILLNESS:   David Dunn is a 64 y.o. old male who  has a past medical history of COVID-19, Hyperlipemia, and Hypertension.. [facility] with a primary complaint of COVID POS (DX FRIDAY) and Chills  .   64-year-old male presents with complaints shortness of breath weakness and fatigue.  The patient states about a week ago Monday he began to have generalized weakness and fatigue.      He also subsequently lost his sense of smell and taste he has been having severe frontal headaches nausea but no vomiting an episode of diarrhea.  He also endorses fever and chills and generalized body aches and fatigue.  The patient states today he began to have increased shortness of breath and chest tightness.      He describes his symptoms as severe in severity with No exacerbating or alleviating factors.    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   HENT:        Loss of smell loss of taste  "  Respiratory: Positive for cough and shortness of breath.    Cardiovascular: Positive for chest pain.   Gastrointestinal: Positive for diarrhea and nausea.   Genitourinary: Negative.    Musculoskeletal: Positive for back pain.   Skin: Negative.    Neurological: Positive for weakness.   Endo/Heme/Allergies: Negative.    Psychiatric/Behavioral: Negative.            PAST MEDICAL HISTORY:     Past Medical History:   Diagnosis Date    COVID-19     Hyperlipemia     Hypertension        PAST SURGICAL HISTORY:   History reviewed. No pertinent surgical history.    ALLERGIES:   Patient has no known allergies.    FAMILY HISTORY:   History reviewed. No pertinent family history.    SOCIAL HISTORY:     Social History     Tobacco Use    Smoking status: Current Every Day Smoker     Types: Vaping with nicotine, Cigars, Cigarettes     Start date: 1990    Smokeless tobacco: Never Used   Substance Use Topics    Alcohol use: Yes     Alcohol/week: 1.0 standard drinks     Types: 1 Glasses of wine per week     Frequency: Never        Social History     Substance and Sexual Activity   Sexual Activity Yes        HOME MEDICATIONS:     Prior to Admission medications    Medication Sig Start Date End Date Taking? Authorizing Provider   amLODIPine (NORVASC) 5 MG tablet Take 5 mg by mouth once daily. 8/6/20   Historical Provider   ezetimibe (ZETIA) 10 mg tablet Take 10 mg by mouth once daily.    Historical Provider   nebivoloL (BYSTOLIC) 10 MG Tab Take 10 mg by mouth once daily.    Historical Provider   rosuvastatin (CRESTOR) 40 MG Tab Take 10 mg by mouth every evening.    Historical Provider   telmisartan (MICARDIS) 80 MG Tab Take 40 mg by mouth once daily.    Historical Provider         PHYSICAL EXAM:   /69   Pulse 71   Temp 99.5 °F (37.5 °C) (Oral)   Resp (!) 24   Ht 6' 1" (1.854 m)   Wt 98 kg (216 lb)   SpO2 (!) 94%   BMI 28.50 kg/m²   Vitals Reviewed  General appearance:  Ill-appearing male in no apparent distress.  Skin: " No Rash.   Neuro: Motor and sensory exams grossly intact. Good tone. Power in all 4 extremities 5/5.   HENT: Atraumatic head. Moist mucous membranes of oral cavity.  Eyes: Normal extraocular movements.   Neck: Supple. No evidence of lymphadenopathy. No thyroidomegaly.  Lungs:  Rhonchi to bases bilaterally bilaterally. No wheezing present.   Heart: Regular rate and rhythm. S1 and S2 present with no murmurs/gallop/rub. No pedal edema. No JVD present.   Abdomen: Soft, non-distended, non-tender. No rebound tenderness/guarding. No masses or organomegaly. Bowel sounds are normal. Bladder is not palpable.   Extremities: No cyanosis, clubbing, or edema.  Psych/mental status: Alert and oriented. Cooperative. Responds appropriately to questions.   EMERGENCY DEPARTMENT LABS AND IMAGING:   Following labs were Reviewed   Recent Labs   Lab 10/27/20  1543   WBC 7.55   HGB 16.1   HCT 50.0      CALCIUM 9.7   ALBUMIN 4.5   PROT 8.1   *   K 5.0   CO2 24   CL 98   BUN 22   CREATININE 1.2   ALKPHOS 66   ALT 70*   AST 52*   BILITOT 0.7         CMP   Recent Labs   Lab 10/27/20  1543   *   K 5.0   CL 98   CO2 24      BUN 22   CREATININE 1.2   CALCIUM 9.7   PROT 8.1   ALBUMIN 4.5   BILITOT 0.7   ALKPHOS 66   AST 52*   ALT 70*   ANIONGAP 12   ESTGFRAFRICA >60.0   EGFRNONAA >60.0   , CBC   Recent Labs   Lab 10/27/20  1543   WBC 7.55   HGB 16.1   HCT 50.0      , INR No results found for: INR, PROTIME, Lipid Panel No results found for: CHOL, HDL, LDLCALC, TRIG, CHOLHDL, Troponin   Recent Labs   Lab 10/27/20  1543   TROPONINI <0.030   , A1C: No results for input(s): HGBA1C in the last 4320 hours. and All labs within the past 24 hours have been reviewed  Microbiology Results (last 7 days)     Procedure Component Value Units Date/Time    Blood culture (site 2) [[id number]]     Order Status: No result Specimen: Blood         X-Ray Chest AP Portable   Final Result        X-ray Chest Ap Portable    Result Date:  10/27/2020  REASON: Suspected Covid-19 Virus Infection COVID POS?(DX FRIDAY); Chills?; Hx of HTN FINDINGS: Portable chest radiograph with comparison chest x-ray August 19, 2020. The cardiomediastinal silhouette is within normal limits in size.The pulmonary vascular structures are within normal limits. The lungs are clear. No acute osseous abnormality. IMPRESSION: No acute cardiopulmonary process. Electronically Signed by Evens Champagne on 10/27/2020 4:42 PM    12  lead EKG reveals a normal sinus rhythm with a normal axis this good R-wave progression this no significant ST or T-wave abnormalities  milliseconds rate within normal limits  ASSESSMENT & PLAN:   David Dunn is a 64 y.o. male admitted for    1. COVID Pneumonia with Hypoxia  -discussed Remdesivir fact sheet given to patient  -Oral Decadron 6mg given  -inflammatory Markers as below  -CRP:(0.14), Ferritin:(477) Lactic acid:(1.7), Procalcitonin:(<0.05)   - D-Dimer (0.44)  -Supplemental O2  -albuterol metered-dose inhalers  -Consult ID  -Vitamin therapy with Vitamin C and D  -QTc: 373 millsec  -LMWH renal dose            2.. Acute Transaminitis  - IV hydration  - hold all heptotoxic meds  -get Acute Hepatitis panel  -hold statins    3. Essential HTN  -controlled continue home pharmacologic regimen    Remdesivir FDA EUA Verbal Consent  The patient or parent/caregiver has been provided with the remdesivir Fact Sheet for Patients and Parents/Caregivers and has been counseled that the FDA has authorized the emergency use of remdesivir, which is not an FDA approved drug. The significant known and potential risks and benefits are unknown. The patient or parent/caregiver has been given the option to accept or refuse and has verbally agreed to receive remdesivir. Daily labs will be ordered and monitoring for Serious Adverse Events will be performed.      DVT Prophylaxis: will be placed on Heparin/Lovenox for DVT prophylaxis and will be advised to be as  mobile as possible and sit in a chair as tolerated.   ________________________________________________________________________________    Discharge Planning and Disposition: No mobility needs. Ambulating well. Good social support system. Patient will be discharged in   Face-to-Face encounter date: 10/27/2020  Encounter included review of the medical records, interviewing and examining the patient face-to-face, discussion with family and other health care providers including emergency medicine physician, admission orders, interpreting lab/test results and formulating a plan of care.   Medical Decision Making during this encounter was  [_] Low Complexity  [_] Moderate Complexity  [x] High Complexity  _________________________________________________________________________________    INPATIENT LIST OF MEDICATIONS     Current Facility-Administered Medications:     acetaminophen tablet 650 mg, 650 mg, Oral, Q8H PRN, Silvia Javed NP    [START ON 10/28/2020] albuterol inhaler 2 puff, 2 puff, Inhalation, Q6H **AND** [START ON 10/28/2020] MDI Q6H, , , Q6H, Silvia Javed NP    [START ON 10/28/2020] amLODIPine tablet 5 mg, 5 mg, Oral, Daily, Silvia Javed NP    ascorbic acid (vitamin C) tablet 500 mg, 500 mg, Oral, BID, Silvia Javed NP    [START ON 10/28/2020] dexAMETHasone tablet 6 mg, 6 mg, Oral, Daily, Silvia Javed NP    dextromethorphan-guaifenesin  mg/5 ml liquid 10 mL, 10 mL, Oral, Q4H PRN, Sivlia Javed NP    dextrose 50% injection 12.5 g, 12.5 g, Intravenous, PRN, Silvia Javed, NP    dextrose 50% injection 25 g, 25 g, Intravenous, PRN, Silvia Javed, NP    enoxaparin injection 30 mg, 30 mg, Subcutaneous, Q24H, Silvia Javed, NP    glucagon (human recombinant) injection 1 mg, 1 mg, Intramuscular, PRN, Silvia Javed, NP    glucose chewable tablet 16 g, 16 g, Oral, PRN, Silvia Javed, NP    glucose chewable tablet 24 g, 24 g, Oral, PRN, Silvia Javed, NP    HYDROcodone-acetaminophen 5-325 mg  per tablet 1 tablet, 1 tablet, Oral, Q6H PRN, Silvia Javed NP    [START ON 10/28/2020] ipratropium inhaler 2 puff, 2 puff, Inhalation, Q6H **AND** [START ON 10/28/2020] MDI Q6H, , , Q6H, Silvia Javed NP    magnesium oxide tablet 800 mg, 800 mg, Oral, PRN, Silvia Javed NP    melatonin tablet 6 mg, 6 mg, Oral, Nightly PRN, Silvia Javed NP    [START ON 10/28/2020] multivitamin tablet, 1 tablet, Oral, Daily, Silvia Javed NP    [START ON 10/28/2020] nebivoloL tablet 10 mg, 10 mg, Oral, Daily, Silvia Javed NP    ondansetron injection 4 mg, 4 mg, Intravenous, Q8H PRN, Silvia Javed NP    sodium chloride 0.9% flush 10 mL, 10 mL, Intravenous, PRN, Silvia Javed NP    [START ON 10/28/2020] telmisartan tablet 40 mg, 40 mg, Oral, Daily, Silvia Javed NP    [START ON 10/28/2020] vitamin D 1000 units tablet 1,000 Units, 1,000 Units, Oral, Daily, Silvia Javed NP    Current Outpatient Medications:     amLODIPine (NORVASC) 5 MG tablet, Take 5 mg by mouth once daily., Disp: , Rfl:     ezetimibe (ZETIA) 10 mg tablet, Take 10 mg by mouth once daily., Disp: , Rfl:     nebivoloL (BYSTOLIC) 10 MG Tab, Take 10 mg by mouth once daily., Disp: , Rfl:     rosuvastatin (CRESTOR) 40 MG Tab, Take 10 mg by mouth every evening., Disp: , Rfl:     telmisartan (MICARDIS) 80 MG Tab, Take 40 mg by mouth once daily., Disp: , Rfl:       Scheduled Meds:   [START ON 10/28/2020] albuterol  2 puff Inhalation Q6H    [START ON 10/28/2020] amLODIPine  5 mg Oral Daily    ascorbic acid (vitamin C)  500 mg Oral BID    [START ON 10/28/2020] dexAMETHasone  6 mg Oral Daily    enoxaparin  30 mg Subcutaneous Q24H    [START ON 10/28/2020] ipratropium  2 puff Inhalation Q6H    [START ON 10/28/2020] multivitamin  1 tablet Oral Daily    [START ON 10/28/2020] nebivoloL  10 mg Oral Daily    [START ON 10/28/2020] telmisartan  40 mg Oral Daily    [START ON 10/28/2020] vitamin D  1,000 Units Oral Daily     Continuous Infusions:  PRN  Meds:.acetaminophen, dextromethorphan-guaifenesin  mg/5 ml, dextrose 50%, dextrose 50%, glucagon (human recombinant), glucose, glucose, HYDROcodone-acetaminophen, magnesium oxide, melatonin, ondansetron, sodium chloride 0.9%      Silvia Javed  [facility] NP  10/27/2020

## 2020-10-28 NOTE — ED PROVIDER NOTES
Encounter Date: 10/27/2020       History     Chief Complaint   Patient presents with    COVID POS     DX FRIDAY    Chills     HPI   64-year-old male past medical history notable for chronic kidney functions currently on antibiotics, recent diagnosis of COVID with 4 days ago, presents with chest tightness and shortness of breath the past 2 days.    Patient states his PCP placed him on albuterol and prednisone.  Today he use a home pulse ox and was satting 90%.  He denies any overt chest pain.  He has noted fever but has not been taking Tylenol.  No nausea, vomiting, diarrhea.  Review of systems otherwise negative. Patient does not remember name of antibiotics.     Echo in 8/20 with EF 79%      Review of patient's allergies indicates:  No Known Allergies  Past Medical History:   Diagnosis Date    COVID-19     Hyperlipemia     Hypertension      No past surgical history on file.  No family history on file.  Social History     Tobacco Use    Smoking status: Current Every Day Smoker     Types: Vaping with nicotine, Cigars, Cigarettes     Start date: 1990    Smokeless tobacco: Never Used   Substance Use Topics    Alcohol use: Yes     Alcohol/week: 1.0 standard drinks     Types: 1 Glasses of wine per week     Frequency: Never    Drug use: Never     Review of Systems   Constitutional: Positive for fever.   HENT: Negative for sore throat.    Respiratory: Positive for shortness of breath.    Cardiovascular: Positive for palpitations. Negative for chest pain.   Gastrointestinal: Negative for nausea.   Genitourinary: Negative for dysuria.   Musculoskeletal: Negative for back pain.   Skin: Negative for rash.   Neurological: Negative for weakness.   Hematological: Does not bruise/bleed easily.       Physical Exam     Initial Vitals [10/27/20 1517]   BP Pulse Resp Temp SpO2   139/77 88 20 (!) 101.4 °F (38.6 °C) 96 %      MAP       --         Physical Exam    Nursing note and vitals reviewed.  Constitutional: He appears  well-developed. He is not diaphoretic.   Alert   HENT:   Head: Normocephalic and atraumatic.   Mouth/Throat: Oropharynx is clear and moist.   Eyes: EOM are normal. Pupils are equal, round, and reactive to light.   Neck: Normal range of motion.   Cardiovascular: Normal rate, regular rhythm, normal heart sounds and intact distal pulses.   No murmur heard.  Pulmonary/Chest: Breath sounds normal. No respiratory distress. He has no wheezes. He has no rales.   Saturating 93% on RA.    Abdominal: Soft. He exhibits no distension. There is no abdominal tenderness.   Musculoskeletal: Normal range of motion.   Neurological: He is alert and oriented to person, place, and time.   Skin: Skin is warm and dry.   Psychiatric: He has a normal mood and affect.         ED Course   Procedures  Labs Reviewed   CBC W/ AUTO DIFFERENTIAL - Abnormal; Notable for the following components:       Result Value    Immature Granulocytes 0.7 (*)     Immature Grans (Abs) 0.05 (*)     Lymph % 15.1 (*)     All other components within normal limits   COMPREHENSIVE METABOLIC PANEL - Abnormal; Notable for the following components:    Sodium 134 (*)     AST 52 (*)     ALT 70 (*)     All other components within normal limits   FERRITIN - Abnormal; Notable for the following components:    Ferritin 477 (*)     All other components within normal limits   SARS-COV-2 RNA AMPLIFICATION, QUAL - Abnormal; Notable for the following components:    SARS-CoV-2 RNA, Amplification, Qual Positive (*)     All other components within normal limits    Narrative:     KENDRICK HERNANDEZ RN, ED, COVID-19 POSITIVE  by JB3 10/27/2020 16:51   URINALYSIS, REFLEX TO URINE CULTURE - Abnormal; Notable for the following components:    Protein, UA 1+ (*)     All other components within normal limits    Narrative:     Specimen Source->Urine   URINALYSIS MICROSCOPIC - Abnormal; Notable for the following components:    RBC, UA 5 (*)     Hyaline Casts, UA 10 (*)     All other components  within normal limits    Narrative:     Specimen Source->Urine   C-REACTIVE PROTEIN   LACTATE DEHYDROGENASE   CK   LACTIC ACID, PLASMA   TROPONIN I   PROCALCITONIN     EKG Readings: (Independently Interpreted)   Initial Reading: No STEMI. Rhythm: Normal Sinus Rhythm. Heart Rate: 82. Conduction: Normal. ST Segments: Normal ST Segments. T Waves: Normal. Axis: Normal. Other Impression: QTc 373     ECG Results          EKG 12-lead (In process)  Result time 10/27/20 15:47:34    In process by Interface, Lab In [facility] (10/27/20 15:47:34)                 Narrative:    Test Reason : Z20.828,    Vent. Rate : 082 BPM     Atrial Rate : 082 BPM     P-R Int : 148 ms          QRS Dur : 080 ms      QT Int : 320 ms       P-R-T Axes : 044 050 045 degrees     QTc Int : 373 ms    Normal sinus rhythm  Normal ECG  When compared with ECG of 19-AUG-2020 20:29,  No significant change was found    Referred By: [email or web address]   SELF           Confirmed By:                   In process by Interface, Lab In [facility] (10/27/20 15:36:18)                 Narrative:    Test Reason : Z20.828,    Vent. Rate : 082 BPM     Atrial Rate : 082 BPM     P-R Int : 148 ms          QRS Dur : 080 ms      QT Int : 320 ms       P-R-T Axes : 044 050 045 degrees     QTc Int : 373 ms    Normal sinus rhythm  Normal ECG  When compared with ECG of 19-AUG-2020 20:29,  No significant change was found    Referred By: [email or web address]   SELF           Confirmed By:                             Imaging Results          X-Ray Chest AP Portable (Final result)  Result time 10/27/20 16:39:32    Final result by Evens Champagne MD (10/27/20 16:39:32)                 Narrative:    REASON: Suspected Covid-19 Virus Infection COVID POS?(DX FRIDAY);  Chills?; Hx of HTN    FINDINGS:    Portable chest radiograph with comparison chest x-ray August 19, 2020.  The cardiomediastinal silhouette is within normal limits in size.The  pulmonary vascular structures are within normal limits. The lungs  are  clear. No acute osseous abnormality.    IMPRESSION:    No acute cardiopulmonary process.    Electronically Signed by Evens Champagne on 10/27/2020 4:42 PM                               Medical Decision Making:   History:   Old Medical Records: I decided to obtain old medical records.  Initial Assessment:   64-year-old male past medical history notable for chronic kidney functions currently on antibiotics, recent diagnosis of COVID with 4 days ago, presents with chest tightness and shortness of breath the past 2 days.  The patient had 90% oxygen saturation on home pulse ox.  He is 93% on room air here.   Differential Diagnosis:   Hypoxia secondary to pneumonia, COVID, ACS.  Clinical Tests:   Lab Tests: Ordered and Reviewed  Radiological Study: Ordered and Reviewed  Medical Tests: Ordered and Reviewed  ED Management:  Patient without evidence of pneumonia on CXR. Still saturating on RA at 93%. Given patient is already on steroids feel that admission is warranted. Discussed case with internal medicine who will admit.   Other:   I have discussed this case with another health care provider.  Norbert Shepard M.D.  PGY 3  Emergency Medicine                Attending Attestation:   Physician Attestation Statement for Resident:  As the supervising MD  I agree with the above history. -:   As the supervising MD I agree with the above PE.    As the supervising MD I agree with the above treatment, course, plan, and disposition.  I have reviewed and agree with the residents interpretation of the following: lab data, x-rays and EKG.                    ED Course as of Oct 27 1940   Tue Oct 27, 2020   1814 O2 saturation 93% on RA. Feel that patient would benefit from admission. Will discuss with internal medicine    [BF]      ED Course User Index  [BF] Norbert Shepard MD            Clinical Impression:       ICD-10-CM ICD-9-CM   1. COVID-19  U07.1 079.89   2. Fever  R50.9 780.60   3. Suspected COVID-19 virus infection  Z20.828  V01.79   4. Hypoxia  R09.02 799.02                          ED Disposition Condition    Admit                             Norbert Shepard MD  Resident  10/27/20 1940       Adonis Aranda MD  10/27/20 2049

## 2020-10-29 LAB
ALBUMIN SERPL BCP-MCNC: 3.6 G/DL (ref 3.5–5.2)
ALP SERPL-CCNC: 52 U/L (ref 55–135)
ALT SERPL W/O P-5'-P-CCNC: 59 U/L (ref 10–44)
ANION GAP SERPL CALC-SCNC: 10 MMOL/L (ref 8–16)
AST SERPL-CCNC: 38 U/L (ref 10–40)
BASOPHILS # BLD AUTO: 0.02 K/UL (ref 0–0.2)
BASOPHILS NFR BLD: 0.3 % (ref 0–1.9)
BILIRUB SERPL-MCNC: 0.6 MG/DL (ref 0.1–1)
BUN SERPL-MCNC: 26 MG/DL (ref 8–23)
CALCIUM SERPL-MCNC: 9.6 MG/DL (ref 8.7–10.5)
CHLORIDE SERPL-SCNC: 102 MMOL/L (ref 95–110)
CO2 SERPL-SCNC: 24 MMOL/L (ref 23–29)
CREAT SERPL-MCNC: 0.9 MG/DL (ref 0.5–1.4)
CRP SERPL-MCNC: 0.25 MG/DL
D DIMER PPP IA.FEU-MCNC: 0.35 UG/ML FEU
DIFFERENTIAL METHOD: ABNORMAL
EOSINOPHIL # BLD AUTO: 0 K/UL (ref 0–0.5)
EOSINOPHIL NFR BLD: 0 % (ref 0–8)
ERYTHROCYTE [DISTWIDTH] IN BLOOD BY AUTOMATED COUNT: 12 % (ref 11.5–14.5)
EST. GFR  (AFRICAN AMERICAN): >60 ML/MIN/1.73 M^2
EST. GFR  (NON AFRICAN AMERICAN): >60 ML/MIN/1.73 M^2
FERRITIN SERPL-MCNC: 474 NG/ML (ref 20–300)
GLUCOSE SERPL-MCNC: 112 MG/DL (ref 70–110)
HCT VFR BLD AUTO: 43.3 % (ref 40–54)
HGB BLD-MCNC: 13.8 G/DL (ref 14–18)
IMM GRANULOCYTES # BLD AUTO: 0.04 K/UL (ref 0–0.04)
IMM GRANULOCYTES NFR BLD AUTO: 0.6 % (ref 0–0.5)
LYMPHOCYTES # BLD AUTO: 1.2 K/UL (ref 1–4.8)
LYMPHOCYTES NFR BLD: 17.6 % (ref 18–48)
MAGNESIUM SERPL-MCNC: 2.1 MG/DL (ref 1.6–2.6)
MCH RBC QN AUTO: 29.3 PG (ref 27–31)
MCHC RBC AUTO-ENTMCNC: 31.9 G/DL (ref 32–36)
MCV RBC AUTO: 92 FL (ref 82–98)
MONOCYTES # BLD AUTO: 0.7 K/UL (ref 0.3–1)
MONOCYTES NFR BLD: 11.2 % (ref 4–15)
NEUTROPHILS # BLD AUTO: 4.6 K/UL (ref 1.8–7.7)
NEUTROPHILS NFR BLD: 70.3 % (ref 38–73)
NRBC BLD-RTO: 0 /100 WBC
PHOSPHATE SERPL-MCNC: 4.3 MG/DL (ref 2.7–4.5)
PLATELET # BLD AUTO: 224 K/UL (ref 150–350)
PMV BLD AUTO: 9.5 FL (ref 9.2–12.9)
POTASSIUM SERPL-SCNC: 4.7 MMOL/L (ref 3.5–5.1)
PROT SERPL-MCNC: 6.7 G/DL (ref 6–8.4)
RBC # BLD AUTO: 4.71 M/UL (ref 4.6–6.2)
SODIUM SERPL-SCNC: 136 MMOL/L (ref 136–145)
WBC # BLD AUTO: 6.54 K/UL (ref 3.9–12.7)

## 2020-10-29 PROCEDURE — 36415 COLL VENOUS BLD VENIPUNCTURE: CPT

## 2020-10-29 PROCEDURE — 25000003 PHARM REV CODE 250: Performed by: INTERNAL MEDICINE

## 2020-10-29 PROCEDURE — 99900035 HC TECH TIME PER 15 MIN (STAT)

## 2020-10-29 PROCEDURE — 63600175 PHARM REV CODE 636 W HCPCS: Performed by: NURSE PRACTITIONER

## 2020-10-29 PROCEDURE — 85025 COMPLETE CBC W/AUTO DIFF WBC: CPT

## 2020-10-29 PROCEDURE — 80053 COMPREHEN METABOLIC PANEL: CPT

## 2020-10-29 PROCEDURE — 25000003 PHARM REV CODE 250: Performed by: NURSE PRACTITIONER

## 2020-10-29 PROCEDURE — 21400001 HC TELEMETRY ROOM

## 2020-10-29 PROCEDURE — 25000242 PHARM REV CODE 250 ALT 637 W/ HCPCS: Performed by: NURSE PRACTITIONER

## 2020-10-29 PROCEDURE — 99232 SBSQ HOSP IP/OBS MODERATE 35: CPT | Mod: ,,, | Performed by: INTERNAL MEDICINE

## 2020-10-29 PROCEDURE — 83735 ASSAY OF MAGNESIUM: CPT

## 2020-10-29 PROCEDURE — 86140 C-REACTIVE PROTEIN: CPT

## 2020-10-29 PROCEDURE — 94761 N-INVAS EAR/PLS OXIMETRY MLT: CPT

## 2020-10-29 PROCEDURE — 84100 ASSAY OF PHOSPHORUS: CPT

## 2020-10-29 PROCEDURE — 85379 FIBRIN DEGRADATION QUANT: CPT

## 2020-10-29 PROCEDURE — 99232 PR SUBSEQUENT HOSPITAL CARE,LEVL II: ICD-10-PCS | Mod: ,,, | Performed by: INTERNAL MEDICINE

## 2020-10-29 PROCEDURE — 82728 ASSAY OF FERRITIN: CPT

## 2020-10-29 PROCEDURE — 27000221 HC OXYGEN, UP TO 24 HOURS

## 2020-10-29 PROCEDURE — 94640 AIRWAY INHALATION TREATMENT: CPT

## 2020-10-29 RX ADMIN — AMLODIPINE BESYLATE 10 MG: 5 TABLET ORAL at 08:10

## 2020-10-29 RX ADMIN — DEXAMETHASONE 6 MG: 2 TABLET ORAL at 09:10

## 2020-10-29 RX ADMIN — THERA TABS 1 TABLET: TAB at 09:10

## 2020-10-29 RX ADMIN — ALBUTEROL SULFATE 2 PUFF: 90 AEROSOL, METERED RESPIRATORY (INHALATION) at 01:10

## 2020-10-29 RX ADMIN — ALBUTEROL SULFATE 2 PUFF: 90 AEROSOL, METERED RESPIRATORY (INHALATION) at 08:10

## 2020-10-29 RX ADMIN — TELMISARTAN 40 MG: 40 TABLET ORAL at 09:10

## 2020-10-29 RX ADMIN — MECLIZINE HYDROCHLORIDE 25 MG: 12.5 TABLET ORAL at 02:10

## 2020-10-29 RX ADMIN — NEBIVOLOL HYDROCHLORIDE 10 MG: 10 TABLET ORAL at 09:10

## 2020-10-29 RX ADMIN — REMDESIVIR 100 MG: 100 INJECTION, POWDER, LYOPHILIZED, FOR SOLUTION INTRAVENOUS at 09:10

## 2020-10-29 RX ADMIN — ALBUTEROL SULFATE 2 PUFF: 90 AEROSOL, METERED RESPIRATORY (INHALATION) at 07:10

## 2020-10-29 NOTE — SUBJECTIVE & OBJECTIVE
Interval History: Pt is much improved S/P treatment with reduced sob, valencia and cough. Pt can lay flat in bed    Review of Systems   Constitutional: Positive for appetite change, diaphoresis and fatigue. Negative for chills and fever.   HENT: Negative.    Eyes: Negative.    Respiratory: Positive for cough, shortness of breath and wheezing.         Improved   Cardiovascular: Positive for palpitations.   Gastrointestinal: Negative.    Endocrine: Positive for cold intolerance and heat intolerance.   Genitourinary: Negative.    Musculoskeletal: Positive for arthralgias, back pain and myalgias.   Skin: Negative.    Allergic/Immunologic: Negative.    Neurological: Positive for weakness.   Hematological: Bruises/bleeds easily.   Psychiatric/Behavioral: Positive for decreased concentration and sleep disturbance. Negative for self-injury and suicidal ideas. The patient is nervous/anxious.      Objective:     Vital Signs (Most Recent):  Temp: 98.2 °F (36.8 °C) (10/28/20 1843)  Pulse: 67 (10/28/20 1843)  Resp: 18 (10/28/20 1843)  BP: (!) 116/58 (10/28/20 1843)  SpO2: (!) 92 % (10/28/20 1843) Vital Signs (24h Range):  Temp:  [96 °F (35.6 °C)-101.1 °F (38.4 °C)] 98.2 °F (36.8 °C)  Pulse:  [64-87] 67  Resp:  [] 18  SpO2:  [7 %-99 %] 92 %  BP: (109-155)/(52-89) 116/58     Weight: 98 kg (216 lb)  Body mass index is 28.5 kg/m².    Intake/Output Summary (Last 24 hours) at 10/28/2020 1946  Last data filed at 10/28/2020 1715  Gross per 24 hour   Intake 413.34 ml   Output --   Net 413.34 ml      Physical Exam  Vitals signs and nursing note reviewed.   Constitutional:       Appearance: Normal appearance.   HENT:      Head: Normocephalic and atraumatic.      Nose: Nose normal.      Mouth/Throat:      Mouth: Mucous membranes are moist.   Eyes:      Extraocular Movements: Extraocular movements intact.      Pupils: Pupils are equal, round, and reactive to light.   Neck:      Musculoskeletal: Normal range of motion and neck supple.       Comments: No use of accessory muscles of respiration  Cardiovascular:      Rate and Rhythm: Normal rate and regular rhythm.      Heart sounds: Gallop present.    Pulmonary:      Breath sounds: Wheezing, rhonchi and rales present.      Comments: Intermittent at the bases  Abdominal:      General: Bowel sounds are normal. There is no distension.      Tenderness: There is no abdominal tenderness. There is no guarding.   Musculoskeletal: Normal range of motion.   Skin:     General: Skin is warm.   Neurological:      General: No focal deficit present.      Mental Status: He is alert and oriented to person, place, and time.   Psychiatric:         Mood and Affect: Mood normal.         Significant Labs:   Recent Lab Results       10/28/20  0858   10/28/20  0338   10/27/20  2105        Unit Blood Type Code 0600[P]          0600[P]         Unit Expiration [id number][P]          [id number][P]         Unit Blood Type A NEG[P]          A NEG[P]         Albumin   3.7       Alkaline Phosphatase   54       ALT   70       Anion Gap   9       AST   50       Baso #   0.07       Basophil %   1.0       BILIRUBIN TOTAL   0.8  Comment:  For infants and newborns, interpretation of results should be based  on gestational age, weight and in agreement with clinical  observations.  Premature Infant recommended reference ranges:  Up to 24 hours.............<8.0 mg/dL  Up to 48 hours............<12.0 mg/dL  3-5 days..................<15.0 mg/dL  6-29 days.................<15.0 mg/dL         BUN   22       Calcium   8.9       Chloride   101       CO2   24       CODING SYSTEM DSIY225[P]          [id number][P]         Creatinine   1.2       CRP     0.14     D-Dimer     0.44  Comment:  <0.50 ug/mL FEU cut-off value for exclusion of venous thromboembolism.     Differential Method   Automated       DISPENSE STATUS ISSUED[P]          ISSUED[P]         eGFR if    >60.0       eGFR if non    >60.0  Comment:  Calculation  used to obtain the estimated glomerular filtration  rate (eGFR) is the CKD-EPI equation.          Eos #   0.0       Eosinophil %   0.1       Glucose   92       Gran # (ANC)   4.0       Gran %   59.8       Group & Rh A POS         Hematocrit   44.8       Hemoglobin   14.2       Immature Grans (Abs)   0.05  Comment:  Mild elevation in immature granulocytes is non specific and   can be seen in a variety of conditions including stress response,   acute inflammation, trauma and pregnancy. Correlation with other   laboratory and clinical findings is essential.         Immature Granulocytes   0.7       INDIRECT DIANA NEG         Lymph #   1.8       Lymph %   26.9       Magnesium   2.0       MCH   29.2       MCHC   31.7       MCV   92       Mono #   0.8       Mono %   11.5       MPV   9.7       nRBC   0       Phosphorus   4.0       Platelets   221       Potassium   4.6       Product Code Q6104L52[P]          [id number][P]         PROTEIN TOTAL   6.9       RBC   4.86       RDW   12.4       Sed Rate     12     Sodium   134       UNIT NUMBER [id number][P]          [id number][P]         WBC   6.72             Significant Imaging: I have reviewed all pertinent imaging results/findings within the past 24 hours.

## 2020-10-29 NOTE — HPI
David Dunn is a 64 y.o. old male who  has a past medical history of COVID-19, Hyperlipemia, and Hypertension.. The patient presented to [facility] on 10/27/2020 with a primary complaint of COVID POS (DX FRIDAY) and Chills  .   64-year-old male presents with complaints shortness of breath weakness and fatigue.  The patient states about a week ago Monday he began to have generalized weakness and fatigue.       He also subsequently lost his sense of smell and taste he has been having severe frontal headaches nausea but no vomiting an episode of diarrhea.  He also endorses fever and chills and generalized body aches and fatigue.  The patient states today he began to have increased shortness of breath and chest tightness.       He describes his symptoms as severe in severity with No exacerbating or alleviating factors.     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

## 2020-10-29 NOTE — HOSPITAL COURSE
10/28/2020  Pt is feeling much better after his initial treatment with diminished cough , sob and santoyo.      10/29/2020  Mr Dunn notes dramatic improvement in respiratory symptoms but has severe fatigue     10/30/2020  Pt is much less short of breath but still has significant fatigue and santoyo. He desires to complete remdesivir therapy on Danny    10/31/2020  Pt is feeling dizzy all the time but not unsteady when he walks. The above distracts him enough that he is concerned Re working/driving etc.  Otherwise he is much improved    11/1/2020  Mr Dunn is feeling better after cetirizine/meclizine with much reduced dizziness. He is able to preform AODL's without dyspnea or desaturation. Pt is aware that he and his family must follow the post covid 19 admit instructions with isolation as indicated  JEFFREY: no general complaint, mild dizziness but improved, SANTOYO with vigorous exertion but is still very fatigued at the end of the day. Otherwise negative( out of 10)  PE: in no distress, alert, HEENT negative labyrinthine maneuvers , neck supple no use of accessory muscles, lungs scant basilar crackles, heart S 4 S 1 S 2 no S 3 RRR, abdomen BS+ non tender Ext without CC or E pulses 2 +, skin negative, Neuro no lateralizing findings

## 2020-10-29 NOTE — PROGRESS NOTES
[facility] Medicine  Progress Note    Patient Name: David Dunn  MRN: [id number]  Patient Class: IP- Inpatient   Admission Date: 10/27/2020  Length of Stay: 1 days  Attending Physician: Eleazar Grimes MD  Primary Care Provider: Td Lawrence MD        Subjective:     Principal Problem:COVID-19 virus infection        HPI:  David Dunn is a 64 y.o. old male who  has a past medical history of COVID-19, Hyperlipemia, and Hypertension.. The patient presented to [facility] on 10/27/2020 with a primary complaint of COVID POS (DX FRIDAY) and Chills  .   64-year-old male presents with complaints shortness of breath weakness and fatigue.  The patient states about a week ago Monday he began to have generalized weakness and fatigue.       He also subsequently lost his sense of smell and taste he has been having severe frontal headaches nausea but no vomiting an episode of diarrhea.  He also endorses fever and chills and generalized body aches and fatigue.  The patient states today he began to have increased shortness of breath and chest tightness.       He describes his symptoms as severe in severity with No exacerbating or alleviating factors.     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      Interval History: Pt is much improved S/P treatment with reduced sob, valencia and cough. Pt can lay flat in bed    Review of Systems   Constitutional: Positive for appetite change, diaphoresis and fatigue. Negative for chills and fever.   HENT: Negative.    Eyes: Negative.    Respiratory: Positive for cough, shortness of breath and wheezing.         Improved    Cardiovascular: Positive for palpitations.   Gastrointestinal: Negative.    Endocrine: Positive for cold intolerance and heat intolerance.   Genitourinary: Negative.    Musculoskeletal: Positive for arthralgias, back pain and myalgias.   Skin: Negative.    Allergic/Immunologic: Negative.    Neurological: Positive for weakness.   Hematological: Bruises/bleeds easily.   Psychiatric/Behavioral: Positive for decreased concentration and sleep disturbance. Negative for self-injury and suicidal ideas. The patient is nervous/anxious.      Objective:     Vital Signs (Most Recent):  Temp: 98.2 °F (36.8 °C) (10/28/20 1843)  Pulse: 67 (10/28/20 1843)  Resp: 18 (10/28/20 1843)  BP: (!) 116/58 (10/28/20 1843)  SpO2: (!) 92 % (10/28/20 1843) Vital Signs (24h Range):  Temp:  [96 °F (35.6 °C)-101.1 °F (38.4 °C)] 98.2 °F (36.8 °C)  Pulse:  [64-87] 67  Resp:  [] 18  SpO2:  [7 %-99 %] 92 %  BP: (109-155)/(52-89) 116/58     Weight: 98 kg (216 lb)  Body mass index is 28.5 kg/m².    Intake/Output Summary (Last 24 hours) at 10/28/2020 1946  Last data filed at 10/28/2020 1715  Gross per 24 hour   Intake 413.34 ml   Output --   Net 413.34 ml      Physical Exam  Vitals signs and nursing note reviewed.   Constitutional:       Appearance: Normal appearance.   HENT:      Head: Normocephalic and atraumatic.      Nose: Nose normal.      Mouth/Throat:      Mouth: Mucous membranes are moist.   Eyes:      Extraocular Movements: Extraocular movements intact.      Pupils: Pupils are equal, round, and reactive to light.   Neck:      Musculoskeletal: Normal range of motion and neck supple.      Comments: No use of accessory muscles of respiration  Cardiovascular:      Rate and Rhythm: Normal rate and regular rhythm.      Heart sounds: Gallop present.    Pulmonary:      Breath sounds: Wheezing, rhonchi and rales present.      Comments: Intermittent at the bases  Abdominal:      General: Bowel sounds are normal. There is no distension.       Tenderness: There is no abdominal tenderness. There is no guarding.   Musculoskeletal: Normal range of motion.   Skin:     General: Skin is warm.   Neurological:      General: No focal deficit present.      Mental Status: He is alert and oriented to person, place, and time.   Psychiatric:         Mood and Affect: Mood normal.         Significant Labs:   Recent Lab Results       10/28/20  0858   10/28/20  0338   10/27/20  2105        Unit Blood Type Code 0600[P]          0600[P]         Unit Expiration [id number][P]          [id number][P]         Unit Blood Type A NEG[P]          A NEG[P]         Albumin   3.7       Alkaline Phosphatase   54       ALT   70       Anion Gap   9       AST   50       Baso #   0.07       Basophil %   1.0       BILIRUBIN TOTAL   0.8  Comment:  For infants and newborns, interpretation of results should be based  on gestational age, weight and in agreement with clinical  observations.  Premature Infant recommended reference ranges:  Up to 24 hours.............<8.0 mg/dL  Up to 48 hours............<12.0 mg/dL  3-5 days..................<15.0 mg/dL  6-29 days.................<15.0 mg/dL         BUN   22       Calcium   8.9       Chloride   101       CO2   24       CODING SYSTEM QSPZ073[P]          [id number][P]         Creatinine   1.2       CRP     0.14     D-Dimer     0.44  Comment:  <0.50 ug/mL FEU cut-off value for exclusion of venous thromboembolism.     Differential Method   Automated       DISPENSE STATUS ISSUED[P]          ISSUED[P]         eGFR if    >60.0       eGFR if non    >60.0  Comment:  Calculation used to obtain the estimated glomerular filtration  rate (eGFR) is the CKD-EPI equation.          Eos #   0.0       Eosinophil %   0.1       Glucose   92       Gran # (ANC)   4.0       Gran %   59.8       Group & Rh A POS         Hematocrit   44.8       Hemoglobin   14.2       Immature Grans (Abs)   0.05  Comment:  Mild elevation in immature  granulocytes is non specific and   can be seen in a variety of conditions including stress response,   acute inflammation, trauma and pregnancy. Correlation with other   laboratory and clinical findings is essential.         Immature Granulocytes   0.7       INDIRECT DIANA NEG         Lymph #   1.8       Lymph %   26.9       Magnesium   2.0       MCH   29.2       MCHC   31.7       MCV   92       Mono #   0.8       Mono %   11.5       MPV   9.7       nRBC   0       Phosphorus   4.0       Platelets   221       Potassium   4.6       Product Code M3051B79[P]          [id number][P]         PROTEIN TOTAL   6.9       RBC   4.86       RDW   12.4       Sed Rate     12     Sodium   134       UNIT NUMBER [id number][P]          [id number][P]         WBC   6.72             Significant Imaging: I have reviewed all pertinent imaging results/findings within the past 24 hours.      Assessment/Plan:   10/28/2020  A)  Covid 19 pneumonia with hypoxia symptoms improved after treatment. O 2 sat stable on nasal canula  Transaminitis secondary to the above  HTN   P)  O 2  Remdesivir, decadron, convalescent plasma  ID input appreciated        10/27/2020  . COVID Pneumonia with Hypoxia  -discussed Remdesivir fact sheet given to patient  -Oral Decadron 6mg given  -inflammatory Markers as below  -CRP:(0.14), Ferritin:(477) Lactic acid:(1.7), Procalcitonin:(<0.05)   - D-Dimer (0.44)  -Supplemental O2  -albuterol metered-dose inhalers  -Consult ID  -Vitamin therapy with Vitamin C and D  -QTc: 373 millsec  -LMWH renal dose            2.. Acute Transaminitis  - IV hydration  - hold all heptotoxic meds  -get Acute Hepatitis panel  -hold statins     3. Essential HTN  -controlled continue home pharmacologic regimen     Remdesivir FDA EUA Verbal Consent  The patient or parent/caregiver has been provided with the remdesivir Fact Sheet for Patients and Parents/Caregivers and has been counseled that the FDA has authorized the emergency use of  remdesivir, which is not an FDA approved drug. The significant known and potential risks and benefits are unknown. The patient or parent/caregiver has been given the option to accept or refuse and has verbally agreed to receive remdesivir. Daily labs will be ordered and monitoring for Serious Adverse Events will be performed.  No notes have been filed under this hospital service.  Service: Hospital Medicine    VTE Risk Mitigation (From admission, onward)         Ordered     enoxaparin injection 40 mg  Every 24 hours      10/27/20 2220     IP VTE HIGH RISK PATIENT  Once      10/27/20 2025                Discharge Planning   BRENT:      Code Status: Full Code   Is the patient medically ready for discharge?:     Reason for patient still in hospital (select all that apply): Patient new problem, Treatment and Consult recommendations                     Eleazar Grimes MD  Department of Hospital Medicine   [facility]

## 2020-10-29 NOTE — PROGRESS NOTES
Consult Note  Infectious Disease    Reason for Consult:  COVID    HPI: David Dunn is a  64 y.o. male    presented with low oxygen sat as measured by his wife.   The patient states 10 days  he began to have generalized weakness and fatigue and low grade fever. He was seen by his primary care physician, diagnosed with COVID and given albuterol and medrol dose josh. He denies wheezing , chest pain, sputum production, though he has had cough.  He had transient improvement in temperature but fever escalated 3-4 days ago. He has not been eating or drinking well and today feel worse, with dizziness(not vertigo).  He is currently on 4 liters NC    10/29: afebrile. Sat 90-98% on RA this morning, depending on position. He feels better, coughing minimally, less lightheaded. No diarrhea, chest pain, calf tenderness. His home took damage last pm from [place].    COVID 19: 10/27rm  Procalcitonin: normal  Troponin:   BNP :  D-dimer: 0.44, 0.35  LDH:  Triglycerides:  Ferritin: 477, 474  azithromycin :   Remdesivir : 10/28  Convalescent plasma:10/28 x2  Blood type:A positive  IL-6:  Tocilizumab:  Recent Labs   Lab 10/27/20  1543 10/27/20  2105   CRP 0.09 0.14          EXAM & DIAGNOSTICS REVIEWED:   Vitals:     Temp:  [96 °F (35.6 °C)-99.3 °F (37.4 °C)]   Temp: 98.1 °F (36.7 °C) (10/29/20 0700)  Pulse: 74 (10/29/20 0720)  Resp: 15 (10/29/20 0720)  BP: 122/62 (10/29/20 0350)  SpO2: 95 % (10/29/20 0720)    Intake/Output Summary (Last 24 hours) at 10/29/2020 0824  Last data filed at 10/28/2020 1715  Gross per 24 hour   Intake 613.34 ml   Output --   Net 613.34 ml       General:  Feels poorly, speaks softly, Alert and attentive, cooperative, uncomfortable  Eyes:  Anicteric,   EOMI  ENT:  No ulcers, exudates, thrush, nares patent, dentition is excellent  Neck:  supple,    Lungs: Clear, no consolidation, rales, wheezes, rub  Heart:  RRR, no gallop/murmur/rub noted  Abd:  Soft, NT, ND, normal BS, no masses or organomegaly  appreciated.  :  Voids  no flank tenderness  Musc:  Joints without effusion, swelling, erythema, synovitis, muscle wasting.   Skin:  No rashes.    Wound:   Neuro:              Alert, attentive, speech fluent, face symmetric, moves all extremities, no focal weakness. Ambulatory  Psych:  Calm, cooperative  Lymphatic:        Extrem: No edema, erythema, phlebitis, cellulitis, warm and well perfused, calves soft and no cords  VAD:   peripheral    Isolation:  COVID    Lines/Tubes/Drains:    General Labs reviewed:  Recent Labs   Lab 10/27/20  1543 10/28/20  0338 10/29/20  0502   WBC 7.55 6.72 6.54   HGB 16.1 14.2 13.8*   HCT 50.0 44.8 43.3    221 224       Recent Labs   Lab 10/27/20  1543 10/28/20  0338 10/29/20  0502   * 134* 136   K 5.0 4.6 4.7   CL 98 101 102   CO2 24 24 24   BUN 22 22 26*   CREATININE 1.2 1.2 0.9   CALCIUM 9.7 8.9 9.6   PROT 8.1 6.9 6.7   BILITOT 0.7 0.8 0.6   ALKPHOS 66 54* 52*   ALT 70* 70* 59*   AST 52* 50* 38           Micro:  Microbiology Results (last 7 days)     Procedure Component Value Units Date/Time    Blood culture (site 2) [[id number]] Collected: 10/27/20 1543    Order Status: Completed Specimen: Blood from Peripheral, Antecubital, Right Updated: 10/29/20 0232     Blood Culture, Routine No Growth to date      No Growth to date    Narrative:      Site # 2, aerobic only        Imaging Reviewed:   CXR      Cardiology:    IMPRESSION & PLAN   1. COVID 19 infection with hypoxemia, without pneumonia   Dizziness, ?volume depletion or eustachian elated  2. Hypertension  3. GE      Recommendations:     continue remdesivir, day 2/5   possibly home tomorrow  Trend CBC CMP while on remdesivir  Trend ferritin, D dimer     Medical Decision Making during this encounter was  [_] Low Complexity  [x_] Moderate Complexity  [  ] High Complexity

## 2020-10-29 NOTE — CARE UPDATE
10/28/20 2005   Patient Assessment/Suction   Level of Consciousness (AVPU) alert   Respiratory Effort Normal;Unlabored   Expansion/Accessory Muscles/Retractions no use of accessory muscles;no retractions;tracheal tugging   All Lung Fields Breath Sounds equal bilaterally;diminished   Rhythm/Pattern, Respiratory unlabored;pattern regular;depth regular   Cough Frequency frequent   Cough Type dry;nonproductive   PRE-TX-O2   O2 Device (Oxygen Therapy) nasal cannula   $ Is the patient on Low Flow Oxygen? Yes   Flow (L/min) 2   SpO2 (!) 91 %   Pulse Oximetry Type Intermittent   $ Pulse Oximetry - Multiple Charge Pulse Oximetry - Multiple   Pulse 68   Resp 18   Inhaler   $ Inhaler Charges MDI (Metered Dose Inahler) Treatment   Daily Review of Necessity (Inhaler) completed   Respiratory Treatment Status (Inhaler) given   Treatment Route (Inhaler) mouthpiece;spacer/holding chamber   Patient Position (Inhaler) HOB elevated   Post Treatment Assessment (Inhaler) breath sounds unchanged;vital signs unchanged   Signs of Intolerance (Inhaler) none   Respiratory Evaluation   $ Care Plan Tech Time 15 min

## 2020-10-29 NOTE — PLAN OF CARE
10/29/20 0720   Patient Assessment/Suction   Level of Consciousness (AVPU) alert   All Lung Fields Breath Sounds clear   PRE-TX-O2   O2 Device (Oxygen Therapy) room air   SpO2 95 %   Pulse Oximetry Type Continuous   $ Pulse Oximetry - Multiple Charge Pulse Oximetry - Multiple   Pulse 74   Resp 15   Inhaler   $ Inhaler Charges MDI (Metered Dose Inahler) Treatment   Daily Review of Necessity (Inhaler) completed   Respiratory Treatment Status (Inhaler) given   Treatment Route (Inhaler) mouthpiece;spacer/holding chamber   Patient Position (Inhaler) sitting in chair   Post Treatment Assessment (Inhaler) breath sounds unchanged   Signs of Intolerance (Inhaler) none   Breath Sounds Post-Respiratory Treatment   Post-treatment Heart Rate (beats/min) 72   Post-treatment Resp Rate (breaths/min) 15   Respiratory Evaluation   $ Care Plan Tech Time 15 min

## 2020-10-30 LAB
ALBUMIN SERPL BCP-MCNC: 3.5 G/DL (ref 3.5–5.2)
ALP SERPL-CCNC: 51 U/L (ref 55–135)
ALT SERPL W/O P-5'-P-CCNC: 63 U/L (ref 10–44)
ANION GAP SERPL CALC-SCNC: 6 MMOL/L (ref 8–16)
AST SERPL-CCNC: 37 U/L (ref 10–40)
BASOPHILS # BLD AUTO: 0.02 K/UL (ref 0–0.2)
BASOPHILS NFR BLD: 0.3 % (ref 0–1.9)
BILIRUB SERPL-MCNC: 0.7 MG/DL (ref 0.1–1)
BUN SERPL-MCNC: 28 MG/DL (ref 8–23)
CALCIUM SERPL-MCNC: 9 MG/DL (ref 8.7–10.5)
CHLORIDE SERPL-SCNC: 106 MMOL/L (ref 95–110)
CO2 SERPL-SCNC: 22 MMOL/L (ref 23–29)
CREAT SERPL-MCNC: 0.9 MG/DL (ref 0.5–1.4)
D DIMER PPP IA.FEU-MCNC: 0.29 UG/ML FEU
DIFFERENTIAL METHOD: ABNORMAL
EOSINOPHIL # BLD AUTO: 0 K/UL (ref 0–0.5)
EOSINOPHIL NFR BLD: 0 % (ref 0–8)
ERYTHROCYTE [DISTWIDTH] IN BLOOD BY AUTOMATED COUNT: 11.9 % (ref 11.5–14.5)
EST. GFR  (AFRICAN AMERICAN): >60 ML/MIN/1.73 M^2
EST. GFR  (NON AFRICAN AMERICAN): >60 ML/MIN/1.73 M^2
FERRITIN SERPL-MCNC: 495 NG/ML (ref 20–300)
GLUCOSE SERPL-MCNC: 115 MG/DL (ref 70–110)
HCT VFR BLD AUTO: 41.9 % (ref 40–54)
HGB BLD-MCNC: 13.8 G/DL (ref 14–18)
IMM GRANULOCYTES # BLD AUTO: 0.03 K/UL (ref 0–0.04)
IMM GRANULOCYTES NFR BLD AUTO: 0.5 % (ref 0–0.5)
LYMPHOCYTES # BLD AUTO: 1.1 K/UL (ref 1–4.8)
LYMPHOCYTES NFR BLD: 16.2 % (ref 18–48)
MAGNESIUM SERPL-MCNC: 2.2 MG/DL (ref 1.6–2.6)
MCH RBC QN AUTO: 29.4 PG (ref 27–31)
MCHC RBC AUTO-ENTMCNC: 32.9 G/DL (ref 32–36)
MCV RBC AUTO: 89 FL (ref 82–98)
MONOCYTES # BLD AUTO: 0.6 K/UL (ref 0.3–1)
MONOCYTES NFR BLD: 8.8 % (ref 4–15)
NEUTROPHILS # BLD AUTO: 4.9 K/UL (ref 1.8–7.7)
NEUTROPHILS NFR BLD: 74.2 % (ref 38–73)
NRBC BLD-RTO: 0 /100 WBC
PHOSPHATE SERPL-MCNC: 4.6 MG/DL (ref 2.7–4.5)
PLATELET # BLD AUTO: 241 K/UL (ref 150–350)
PMV BLD AUTO: 9.8 FL (ref 9.2–12.9)
POTASSIUM SERPL-SCNC: 4.6 MMOL/L (ref 3.5–5.1)
PROT SERPL-MCNC: 6.7 G/DL (ref 6–8.4)
RBC # BLD AUTO: 4.7 M/UL (ref 4.6–6.2)
SODIUM SERPL-SCNC: 134 MMOL/L (ref 136–145)
WBC # BLD AUTO: 6.59 K/UL (ref 3.9–12.7)

## 2020-10-30 PROCEDURE — 83735 ASSAY OF MAGNESIUM: CPT

## 2020-10-30 PROCEDURE — 94640 AIRWAY INHALATION TREATMENT: CPT

## 2020-10-30 PROCEDURE — 21400001 HC TELEMETRY ROOM

## 2020-10-30 PROCEDURE — 25000003 PHARM REV CODE 250: Performed by: NURSE PRACTITIONER

## 2020-10-30 PROCEDURE — 25000242 PHARM REV CODE 250 ALT 637 W/ HCPCS: Performed by: NURSE PRACTITIONER

## 2020-10-30 PROCEDURE — 94761 N-INVAS EAR/PLS OXIMETRY MLT: CPT

## 2020-10-30 PROCEDURE — 85025 COMPLETE CBC W/AUTO DIFF WBC: CPT

## 2020-10-30 PROCEDURE — 99232 SBSQ HOSP IP/OBS MODERATE 35: CPT | Mod: ,,, | Performed by: INTERNAL MEDICINE

## 2020-10-30 PROCEDURE — 99900035 HC TECH TIME PER 15 MIN (STAT)

## 2020-10-30 PROCEDURE — 36415 COLL VENOUS BLD VENIPUNCTURE: CPT

## 2020-10-30 PROCEDURE — 82728 ASSAY OF FERRITIN: CPT

## 2020-10-30 PROCEDURE — 25000003 PHARM REV CODE 250: Performed by: INTERNAL MEDICINE

## 2020-10-30 PROCEDURE — 63600175 PHARM REV CODE 636 W HCPCS: Performed by: NURSE PRACTITIONER

## 2020-10-30 PROCEDURE — 85379 FIBRIN DEGRADATION QUANT: CPT

## 2020-10-30 PROCEDURE — 99232 PR SUBSEQUENT HOSPITAL CARE,LEVL II: ICD-10-PCS | Mod: ,,, | Performed by: INTERNAL MEDICINE

## 2020-10-30 PROCEDURE — 84100 ASSAY OF PHOSPHORUS: CPT

## 2020-10-30 PROCEDURE — 80053 COMPREHEN METABOLIC PANEL: CPT

## 2020-10-30 PROCEDURE — 99900031 HC PATIENT EDUCATION (STAT)

## 2020-10-30 RX ORDER — ALBUTEROL SULFATE 90 UG/1
2 AEROSOL, METERED RESPIRATORY (INHALATION) 2 TIMES DAILY
Status: DISCONTINUED | OUTPATIENT
Start: 2020-10-30 | End: 2020-10-30

## 2020-10-30 RX ORDER — ALBUTEROL SULFATE 90 UG/1
2 AEROSOL, METERED RESPIRATORY (INHALATION)
Status: DISCONTINUED | OUTPATIENT
Start: 2020-10-30 | End: 2020-10-30

## 2020-10-30 RX ORDER — ALBUTEROL SULFATE 90 UG/1
2 AEROSOL, METERED RESPIRATORY (INHALATION) EVERY 6 HOURS PRN
Status: DISCONTINUED | OUTPATIENT
Start: 2020-10-30 | End: 2020-11-01 | Stop reason: HOSPADM

## 2020-10-30 RX ADMIN — ALBUTEROL SULFATE 2 PUFF: 90 AEROSOL, METERED RESPIRATORY (INHALATION) at 08:10

## 2020-10-30 RX ADMIN — MECLIZINE HYDROCHLORIDE 25 MG: 12.5 TABLET ORAL at 08:10

## 2020-10-30 RX ADMIN — DEXAMETHASONE 6 MG: 2 TABLET ORAL at 08:10

## 2020-10-30 RX ADMIN — TELMISARTAN 40 MG: 40 TABLET ORAL at 08:10

## 2020-10-30 RX ADMIN — REMDESIVIR 100 MG: 100 INJECTION, POWDER, LYOPHILIZED, FOR SOLUTION INTRAVENOUS at 08:10

## 2020-10-30 RX ADMIN — THERA TABS 1 TABLET: TAB at 08:10

## 2020-10-30 RX ADMIN — NEBIVOLOL HYDROCHLORIDE 10 MG: 10 TABLET ORAL at 08:10

## 2020-10-30 RX ADMIN — ENOXAPARIN SODIUM 40 MG: 40 INJECTION SUBCUTANEOUS at 05:10

## 2020-10-30 RX ADMIN — AMLODIPINE BESYLATE 10 MG: 5 TABLET ORAL at 08:10

## 2020-10-30 NOTE — NURSING
Pt refuses to have 11 pm and 3 am vitals.  He also refuses to have his morning lab draw.  He stated that he does not want to be bothered until at least 5 am.

## 2020-10-30 NOTE — PLAN OF CARE
10/30/20 0811   Patient Assessment/Suction   Level of Consciousness (AVPU) alert   Respiratory Effort Normal;Unlabored   Expansion/Accessory Muscles/Retractions expansion symmetric;no retractions;no use of accessory muscles   All Lung Fields Breath Sounds diminished;clear;equal bilaterally   PRE-TX-O2   O2 Device (Oxygen Therapy) room air   SpO2 95 %   Pulse Oximetry Type Intermittent   $ Pulse Oximetry - Multiple Charge Pulse Oximetry - Multiple   Pulse 88   Resp 20   Inhaler   $ Inhaler Charges MDI (Metered Dose Inahler) Treatment;Given With Spacer   Daily Review of Necessity (Inhaler) completed   Respiratory Treatment Status (Inhaler) given   Treatment Route (Inhaler) mouthpiece;spacer/holding chamber   Patient Position (Inhaler) HOB elevated   Post Treatment Assessment (Inhaler) breath sounds unchanged   Signs of Intolerance (Inhaler) none   Breath Sounds Post-Respiratory Treatment   Post-treatment Heart Rate (beats/min) 87   Post-treatment Resp Rate (breaths/min) 20   Respiratory Evaluation   $ Care Plan Tech Time 15 min

## 2020-10-30 NOTE — CARE UPDATE
10/29/20 2030   Patient Assessment/Suction   Level of Consciousness (AVPU) alert   Respiratory Effort Normal;Unlabored   Expansion/Accessory Muscles/Retractions no retractions;no use of accessory muscles;expansion symmetric   All Lung Fields Breath Sounds equal bilaterally;clear;diminished   Rhythm/Pattern, Respiratory unlabored;pattern regular;depth regular   Cough Frequency infrequent   Cough Type dry;nonproductive   PRE-TX-O2   O2 Device (Oxygen Therapy) nasal cannula   $ Is the patient on Low Flow Oxygen? Yes   Flow (L/min) 2   SpO2 95 %   Pulse Oximetry Type Intermittent   $ Pulse Oximetry - Multiple Charge Pulse Oximetry - Multiple   Pulse 62   Resp 18   Inhaler   $ Inhaler Charges MDI (Metered Dose Inahler) Treatment   Daily Review of Necessity (Inhaler) completed   Respiratory Treatment Status (Inhaler) given   Treatment Route (Inhaler) mouthpiece;spacer/holding chamber   Patient Position (Inhaler) HOB elevated   Post Treatment Assessment (Inhaler) breath sounds unchanged;vital signs unchanged   Signs of Intolerance (Inhaler) none   Respiratory Evaluation   $ Care Plan Tech Time 15 min

## 2020-10-31 LAB
ALBUMIN SERPL BCP-MCNC: 3.3 G/DL (ref 3.5–5.2)
ALP SERPL-CCNC: 50 U/L (ref 55–135)
ALT SERPL W/O P-5'-P-CCNC: 57 U/L (ref 10–44)
ANION GAP SERPL CALC-SCNC: 10 MMOL/L (ref 8–16)
AST SERPL-CCNC: 30 U/L (ref 10–40)
BASOPHILS # BLD AUTO: 0.02 K/UL (ref 0–0.2)
BASOPHILS NFR BLD: 0.2 % (ref 0–1.9)
BILIRUB SERPL-MCNC: 0.6 MG/DL (ref 0.1–1)
BUN SERPL-MCNC: 29 MG/DL (ref 8–23)
CALCIUM SERPL-MCNC: 8.9 MG/DL (ref 8.7–10.5)
CHLORIDE SERPL-SCNC: 105 MMOL/L (ref 95–110)
CO2 SERPL-SCNC: 21 MMOL/L (ref 23–29)
CREAT SERPL-MCNC: 0.8 MG/DL (ref 0.5–1.4)
CRP SERPL-MCNC: 0.05 MG/DL
D DIMER PPP IA.FEU-MCNC: 0.28 UG/ML FEU
DIFFERENTIAL METHOD: ABNORMAL
EOSINOPHIL # BLD AUTO: 0 K/UL (ref 0–0.5)
EOSINOPHIL NFR BLD: 0 % (ref 0–8)
ERYTHROCYTE [DISTWIDTH] IN BLOOD BY AUTOMATED COUNT: 12 % (ref 11.5–14.5)
EST. GFR  (AFRICAN AMERICAN): >60 ML/MIN/1.73 M^2
EST. GFR  (NON AFRICAN AMERICAN): >60 ML/MIN/1.73 M^2
FERRITIN SERPL-MCNC: 444 NG/ML (ref 20–300)
GLUCOSE SERPL-MCNC: 136 MG/DL (ref 70–110)
HCT VFR BLD AUTO: 40.4 % (ref 40–54)
HGB BLD-MCNC: 13 G/DL (ref 14–18)
IMM GRANULOCYTES # BLD AUTO: 0.04 K/UL (ref 0–0.04)
IMM GRANULOCYTES NFR BLD AUTO: 0.5 % (ref 0–0.5)
LYMPHOCYTES # BLD AUTO: 1.4 K/UL (ref 1–4.8)
LYMPHOCYTES NFR BLD: 16.1 % (ref 18–48)
MAGNESIUM SERPL-MCNC: 2.1 MG/DL (ref 1.6–2.6)
MCH RBC QN AUTO: 29 PG (ref 27–31)
MCHC RBC AUTO-ENTMCNC: 32.2 G/DL (ref 32–36)
MCV RBC AUTO: 90 FL (ref 82–98)
MONOCYTES # BLD AUTO: 0.8 K/UL (ref 0.3–1)
MONOCYTES NFR BLD: 9.2 % (ref 4–15)
NEUTROPHILS # BLD AUTO: 6.3 K/UL (ref 1.8–7.7)
NEUTROPHILS NFR BLD: 74 % (ref 38–73)
NRBC BLD-RTO: 0 /100 WBC
PHOSPHATE SERPL-MCNC: 3.9 MG/DL (ref 2.7–4.5)
PLATELET # BLD AUTO: 229 K/UL (ref 150–350)
PMV BLD AUTO: 9.8 FL (ref 9.2–12.9)
POTASSIUM SERPL-SCNC: 4.2 MMOL/L (ref 3.5–5.1)
PROT SERPL-MCNC: 6.2 G/DL (ref 6–8.4)
RBC # BLD AUTO: 4.48 M/UL (ref 4.6–6.2)
SODIUM SERPL-SCNC: 136 MMOL/L (ref 136–145)
WBC # BLD AUTO: 8.56 K/UL (ref 3.9–12.7)

## 2020-10-31 PROCEDURE — 25000242 PHARM REV CODE 250 ALT 637 W/ HCPCS: Performed by: NURSE PRACTITIONER

## 2020-10-31 PROCEDURE — 84100 ASSAY OF PHOSPHORUS: CPT

## 2020-10-31 PROCEDURE — 85379 FIBRIN DEGRADATION QUANT: CPT

## 2020-10-31 PROCEDURE — 25000003 PHARM REV CODE 250: Performed by: INTERNAL MEDICINE

## 2020-10-31 PROCEDURE — 85025 COMPLETE CBC W/AUTO DIFF WBC: CPT

## 2020-10-31 PROCEDURE — 82728 ASSAY OF FERRITIN: CPT

## 2020-10-31 PROCEDURE — 99900035 HC TECH TIME PER 15 MIN (STAT)

## 2020-10-31 PROCEDURE — 80053 COMPREHEN METABOLIC PANEL: CPT

## 2020-10-31 PROCEDURE — 83735 ASSAY OF MAGNESIUM: CPT

## 2020-10-31 PROCEDURE — 25000003 PHARM REV CODE 250: Performed by: NURSE PRACTITIONER

## 2020-10-31 PROCEDURE — 94761 N-INVAS EAR/PLS OXIMETRY MLT: CPT

## 2020-10-31 PROCEDURE — 63600175 PHARM REV CODE 636 W HCPCS: Performed by: NURSE PRACTITIONER

## 2020-10-31 PROCEDURE — 86140 C-REACTIVE PROTEIN: CPT

## 2020-10-31 PROCEDURE — 21400001 HC TELEMETRY ROOM

## 2020-10-31 PROCEDURE — 36415 COLL VENOUS BLD VENIPUNCTURE: CPT

## 2020-10-31 RX ORDER — CETIRIZINE HYDROCHLORIDE 10 MG/1
10 TABLET ORAL DAILY
Status: DISCONTINUED | OUTPATIENT
Start: 2020-10-31 | End: 2020-11-01 | Stop reason: HOSPADM

## 2020-10-31 RX ORDER — MECLIZINE HCL 12.5 MG 12.5 MG/1
25 TABLET ORAL 3 TIMES DAILY
Status: DISCONTINUED | OUTPATIENT
Start: 2020-10-31 | End: 2020-11-01 | Stop reason: HOSPADM

## 2020-10-31 RX ADMIN — REMDESIVIR 100 MG: 100 INJECTION, POWDER, LYOPHILIZED, FOR SOLUTION INTRAVENOUS at 09:10

## 2020-10-31 RX ADMIN — MECLIZINE HYDROCHLORIDE 25 MG: 12.5 TABLET ORAL at 03:10

## 2020-10-31 RX ADMIN — TELMISARTAN 40 MG: 40 TABLET ORAL at 09:10

## 2020-10-31 RX ADMIN — THERA TABS 1 TABLET: TAB at 09:10

## 2020-10-31 RX ADMIN — DEXAMETHASONE 6 MG: 2 TABLET ORAL at 09:10

## 2020-10-31 RX ADMIN — MECLIZINE HYDROCHLORIDE 25 MG: 12.5 TABLET ORAL at 08:10

## 2020-10-31 RX ADMIN — ENOXAPARIN SODIUM 40 MG: 40 INJECTION SUBCUTANEOUS at 04:10

## 2020-10-31 RX ADMIN — AMLODIPINE BESYLATE 10 MG: 5 TABLET ORAL at 09:10

## 2020-10-31 RX ADMIN — CETIRIZINE HYDROCHLORIDE 10 MG: 10 TABLET, FILM COATED ORAL at 03:10

## 2020-10-31 RX ADMIN — NEBIVOLOL HYDROCHLORIDE 10 MG: 10 TABLET ORAL at 09:10

## 2020-10-31 NOTE — PROGRESS NOTES
[facility] Medicine  Progress Note    Patient Name: David Dunn  MRN: [id number]  Patient Class: IP- Inpatient   Admission Date: 10/27/2020  Length of Stay: 3 days  Attending Physician: Eleazar Grimes MD  Primary Care Provider: Td Lawrence MD      This is the 12/29/2020 note  Subjective:     Principal Problem:COVID-19 virus infection        HPI:  David Dunn is a 64 y.o. old male who  has a past medical history of COVID-19, Hyperlipemia, and Hypertension.. The patient presented to [facility] on 10/27/2020 with a primary complaint of COVID POS (DX FRIDAY) and Chills  .   64-year-old male presents with complaints shortness of breath weakness and fatigue.  The patient states about a week ago Monday he began to have generalized weakness and fatigue.       He also subsequently lost his sense of smell and taste he has been having severe frontal headaches nausea but no vomiting an episode of diarrhea.  He also endorses fever and chills and generalized body aches and fatigue.  The patient states today he began to have increased shortness of breath and chest tightness.       He describes his symptoms as severe in severity with No exacerbating or alleviating factors.     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  10/29/2020  Mr Dunn notes dramatic improvement in respiratory symptoms but has severe fatigue    10/30/2020  Pt is much less short of breath but still has significant fatigue and valencia. He desires to complete remdesivir therapy on Sunday    Interval History: Pt has noted great improvement in COVID 19 symptoms. He desires to complete full  remdesivir therapy    Review of Systems   Constitutional: Positive for diaphoresis and fatigue. Negative for chills and fever.   HENT: Negative.    Eyes: Negative.    Respiratory:        SANTOYO   Cardiovascular: Positive for palpitations.   Gastrointestinal: Negative.    Endocrine: Positive for cold intolerance.   Genitourinary: Negative.    Musculoskeletal: Positive for myalgias.   Skin: Negative.    Neurological: Positive for weakness.   Hematological: Bruises/bleeds easily.   Psychiatric/Behavioral: Positive for decreased concentration. The patient is nervous/anxious.      Objective:     Vital Signs (Most Recent):  Temp: 98.3 °F (36.8 °C) (10/30/20 1520)  Pulse: (!) 58 (10/30/20 1520)  Resp: 20 (10/30/20 1520)  BP: (!) 109/51 (10/30/20 1520)  SpO2: (!) 93 % (10/30/20 1520) Vital Signs (24h Range):  Temp:  [97.8 °F (36.6 °C)-98.4 °F (36.9 °C)] 98.3 °F (36.8 °C)  Pulse:  [56-88] 58  Resp:  [18-20] 20  SpO2:  [92 %-96 %] 93 %  BP: (106-114)/(51-67) 109/51     Weight: 98 kg (216 lb)  Body mass index is 28.5 kg/m².    Intake/Output Summary (Last 24 hours) at 10/30/2020 1942  Last data filed at 10/30/2020 0849  Gross per 24 hour   Intake 610 ml   Output --   Net 610 ml      Physical Exam  Vitals signs and nursing note reviewed.   Constitutional:       Appearance: Normal appearance.   HENT:      Head: Normocephalic and atraumatic.      Nose: Nose normal.      Mouth/Throat:      Mouth: Mucous membranes are moist.   Eyes:      Extraocular Movements: Extraocular movements intact.      Pupils: Pupils are equal, round, and reactive to light.   Neck:      Musculoskeletal: Normal range of motion and neck supple.   Cardiovascular:      Rate and Rhythm: Normal rate and regular rhythm.   Pulmonary:      Effort: Pulmonary effort is normal.      Breath sounds: Wheezing and rales present.      Comments: Bibasilar scant-reduced  Musculoskeletal: Normal range of motion.   Skin:     General: Skin is warm.   Neurological:      General: No  focal deficit present.      Mental Status: He is oriented to person, place, and time.   Psychiatric:         Mood and Affect: Mood normal.         Significant Labs:   Recent Lab Results       10/30/20  0436   10/29/20  2101        Albumin 3.5       Alkaline Phosphatase 51       ALT 63       Anion Gap 6       AST 37       Baso # 0.02       Basophil % 0.3       BILIRUBIN TOTAL 0.7  Comment:  For infants and newborns, interpretation of results should be based  on gestational age, weight and in agreement with clinical  observations.  Premature Infant recommended reference ranges:  Up to 24 hours.............<8.0 mg/dL  Up to 48 hours............<12.0 mg/dL  3-5 days..................<15.0 mg/dL  6-29 days.................<15.0 mg/dL         BUN 28       Calcium 9.0       Chloride 106       CO2 22       Creatinine 0.9       CRP   0.25     D-Dimer 0.29  Comment:  <0.50 ug/mL FEU cut-off value for exclusion of venous thromboembolism.       Differential Method Automated       eGFR if  >60.0       eGFR if non  >60.0  Comment:  Calculation used to obtain the estimated glomerular filtration  rate (eGFR) is the CKD-EPI equation.          Eos # 0.0       Eosinophil % 0.0       Ferritin 495       Glucose 115       Gran # (ANC) 4.9       Gran % 74.2       Hematocrit 41.9       Hemoglobin 13.8       Immature Grans (Abs) 0.03  Comment:  Mild elevation in immature granulocytes is non specific and   can be seen in a variety of conditions including stress response,   acute inflammation, trauma and pregnancy. Correlation with other   laboratory and clinical findings is essential.         Immature Granulocytes 0.5       Lymph # 1.1       Lymph % 16.2       Magnesium 2.2       MCH 29.4       MCHC 32.9       MCV 89       Mono # 0.6       Mono % 8.8       MPV 9.8       nRBC 0       Phosphorus 4.6       Platelets 241       Potassium 4.6       PROTEIN TOTAL 6.7       RBC 4.70       RDW 11.9       Sodium 134        WBC 6.59             Significant Imaging: I have reviewed all pertinent imaging results/findings within the past 24 hours.      Assessment/Plan:   10/29/2020  A)  Covid 19 pneumonia with hypoxia  HTN  Much improved  P)  Continue present therapy  Continue Remdesivir       10/28/2020  A)  Covid 19 pneumonia with hypoxia symptoms improved after treatment. O 2 sat stable on nasal canula  Transaminitis secondary to the above  HTN   P)  O 2  Remdesivir, decadron, convalescent plasma  ID input appreciated           10/27/2020  . COVID Pneumonia with Hypoxia  -discussed Remdesivir fact sheet given to patient  -Oral Decadron 6mg given  -inflammatory Markers as below  -CRP:(0.14), Ferritin:(477) Lactic acid:(1.7), Procalcitonin:(<0.05)   - D-Dimer (0.44)  -Supplemental O2  -albuterol metered-dose inhalers  -Consult ID  -Vitamin therapy with Vitamin C and D  -QTc: 373 millsec  -LMWH renal dose         No notes have been filed under this hospital service.  Service: Hospital Medicine    VTE Risk Mitigation (From admission, onward)         Ordered     enoxaparin injection 40 mg  Every 24 hours      10/27/20 2220     IP VTE HIGH RISK PATIENT  Once      10/27/20 2025                Discharge Planning   BRENT:      Code Status: Full Code   Is the patient medically ready for discharge?:     Reason for patient still in hospital (select all that apply): Treatment and Consult recommendations  Discharge Plan A: Home                  Eleazar Grimes MD  Department of Hospital Medicine   [facility]

## 2020-10-31 NOTE — SUBJECTIVE & OBJECTIVE
Interval History: Much improved SOB and SANTOYO but he has periods of extreme fatigue    Review of Systems   Constitutional: Positive for fatigue.   HENT: Negative.    Eyes: Negative.    Respiratory: Negative.    Cardiovascular: Negative.    Gastrointestinal: Negative.    Endocrine: Positive for cold intolerance.   Genitourinary: Negative.    Musculoskeletal: Positive for myalgias.   Skin: Negative.    Neurological: Positive for weakness.   Hematological: Bruises/bleeds easily.   Psychiatric/Behavioral: Positive for decreased concentration.     Objective:     Vital Signs (Most Recent):  Temp: 98.3 °F (36.8 °C) (10/30/20 1520)  Pulse: (!) 58 (10/30/20 1520)  Resp: 20 (10/30/20 1520)  BP: (!) 109/51 (10/30/20 1520)  SpO2: (!) 93 % (10/30/20 1520) Vital Signs (24h Range):  Temp:  [97.8 °F (36.6 °C)-98.4 °F (36.9 °C)] 98.3 °F (36.8 °C)  Pulse:  [56-88] 58  Resp:  [18-20] 20  SpO2:  [92 %-96 %] 93 %  BP: (106-114)/(51-67) 109/51     Weight: 98 kg (216 lb)  Body mass index is 28.5 kg/m².    Intake/Output Summary (Last 24 hours) at 10/30/2020 1953  Last data filed at 10/30/2020 0849  Gross per 24 hour   Intake 610 ml   Output --   Net 610 ml      Physical Exam  Vitals signs and nursing note reviewed.   Constitutional:       General: He is not in acute distress.     Appearance: Normal appearance. He is not ill-appearing.   HENT:      Head: Normocephalic and atraumatic.      Nose: Nose normal.      Mouth/Throat:      Mouth: Mucous membranes are moist.   Eyes:      Extraocular Movements: Extraocular movements intact.      Pupils: Pupils are equal, round, and reactive to light.   Neck:      Musculoskeletal: Normal range of motion and neck supple.   Cardiovascular:      Rate and Rhythm: Normal rate and regular rhythm.   Pulmonary:      Effort: Pulmonary effort is normal.      Breath sounds: Wheezing and rales present.      Comments: reduced  Abdominal:      General: Bowel sounds are normal. There is no distension.      Tenderness:  There is no abdominal tenderness. There is no guarding.   Musculoskeletal: Normal range of motion.   Skin:     General: Skin is warm.   Neurological:      General: No focal deficit present.      Mental Status: He is alert and oriented to person, place, and time.   Psychiatric:         Mood and Affect: Mood normal.         Significant Labs:   Recent Lab Results       10/30/20  0436   10/29/20  2101        Albumin 3.5       Alkaline Phosphatase 51       ALT 63       Anion Gap 6       AST 37       Baso # 0.02       Basophil % 0.3       BILIRUBIN TOTAL 0.7  Comment:  For infants and newborns, interpretation of results should be based  on gestational age, weight and in agreement with clinical  observations.  Premature Infant recommended reference ranges:  Up to 24 hours.............<8.0 mg/dL  Up to 48 hours............<12.0 mg/dL  3-5 days..................<15.0 mg/dL  6-29 days.................<15.0 mg/dL         BUN 28       Calcium 9.0       Chloride 106       CO2 22       Creatinine 0.9       CRP   0.25     D-Dimer 0.29  Comment:  <0.50 ug/mL FEU cut-off value for exclusion of venous thromboembolism.       Differential Method Automated       eGFR if  >60.0       eGFR if non  >60.0  Comment:  Calculation used to obtain the estimated glomerular filtration  rate (eGFR) is the CKD-EPI equation.          Eos # 0.0       Eosinophil % 0.0       Ferritin 495       Glucose 115       Gran # (ANC) 4.9       Gran % 74.2       Hematocrit 41.9       Hemoglobin 13.8       Immature Grans (Abs) 0.03  Comment:  Mild elevation in immature granulocytes is non specific and   can be seen in a variety of conditions including stress response,   acute inflammation, trauma and pregnancy. Correlation with other   laboratory and clinical findings is essential.         Immature Granulocytes 0.5       Lymph # 1.1       Lymph % 16.2       Magnesium 2.2       MCH 29.4       MCHC 32.9       MCV 89       Mono # 0.6        Mono % 8.8       MPV 9.8       nRBC 0       Phosphorus 4.6       Platelets 241       Potassium 4.6       PROTEIN TOTAL 6.7       RBC 4.70       RDW 11.9       Sodium 134       WBC 6.59             Significant Imaging: I have reviewed all pertinent imaging results/findings within the past 24 hours.

## 2020-10-31 NOTE — PROGRESS NOTES
Consult Note  Infectious Disease    Reason for Consult:  COVID    HPI: David Dunn is a  65 y.o. male    presented with low oxygen sat as measured by his wife.   The patient states 10 days  he began to have generalized weakness and fatigue and low grade fever. He was seen by his primary care physician, diagnosed with COVID and given albuterol and medrol dose josh. He denies wheezing , chest pain, sputum production, though he has had cough.  He had transient improvement in temperature but fever escalated 3-4 days ago. He has not been eating or drinking well and today feel worse, with dizziness(not vertigo).  He is currently on 4 liters NC    10/29: afebrile. Sat 90-98% on RA this morning, depending on position. He feels better, coughing minimally, less lightheaded. No diarrhea, chest pain, calf tenderness. His home took damage last pm from [place].  10/30: much improved, showered, shaved, eating well though he has ageusia. Ambulates with out dyspnea though showering did tire him. Would like to receive all 5 days of remdesivir.  10/31:  Afebrile on steroids. He feels good except for stuffy ears and continued sensation of being off balance. He cannot equalize pressure through eustachian tubes    COVID 19: 10/27rm  Procalcitonin: normal  Troponin:   BNP :  D-dimer: 0.44, 0.35, 0.29  LDH:  Triglycerides:  Ferritin: 477, 474, 495, 444  azithromycin :   Remdesivir : 10/28  Convalescent plasma:10/28 x2  Blood type:A positive  IL-6:  Tocilizumab:  Recent Labs   Lab 10/27/20  1543 10/27/20  2105 10/29/20  2101   CRP 0.09 0.14 0.25          EXAM & DIAGNOSTICS REVIEWED:   Vitals:     Temp:  [97.5 °F (36.4 °C)-98.3 °F (36.8 °C)]   Temp: 97.5 °F (36.4 °C) (10/31/20 0732)  Pulse: 67 (10/31/20 0808)  Resp: 16 (10/31/20 0808)  BP: (!) 142/92 (10/31/20 0732)  SpO2: 97 % (10/31/20 0808)  No intake or output data in the 24 hours ending 10/31/20 0952      Exam 10/30, awaiting otoscope  General:    Alert and attentive,  cooperative,  comfortable  Eyes:  Anicteric,   EOMI  ENT:  No ulcers, exudates, thrush, nares patent, dentition is excellent  Neck:  supple,    Lungs: Clear, no consolidation, rales, wheezes, rub, sat 95- 98% on RA and does not desaturate with ambulation in the room  Heart:  RRR, no gallop/murmur/rub noted  Abd:  Soft, NT, ND, normal BS, no masses or organomegaly appreciated.  :  Voids  no flank tenderness  Musc:  Joints without effusion, swelling, erythema, synovitis, muscle wasting.   Skin:  No rashes.    Wound:   Neuro:              Alert, attentive, speech fluent, face symmetric, moves all extremities, no focal weakness. Ambulatory  Psych:  Calm, cooperative  Lymphatic:        Extrem: No edema, erythema, phlebitis, cellulitis, warm and well perfused, calves soft and no cords  VAD:   peripheral    Isolation:  COVID    Lines/Tubes/Drains:    General Labs reviewed:  Recent Labs   Lab 10/29/20  0502 10/30/20  0436 10/31/20  0513   WBC 6.54 6.59 8.56   HGB 13.8* 13.8* 13.0*   HCT 43.3 41.9 40.4    241 229       Recent Labs   Lab 10/29/20  0502 10/30/20  0436 10/31/20  0512    134* 136   K 4.7 4.6 4.2    106 105   CO2 24 22* 21*   BUN 26* 28* 29*   CREATININE 0.9 0.9 0.8   CALCIUM 9.6 9.0 8.9   PROT 6.7 6.7 6.2   BILITOT 0.6 0.7 0.6   ALKPHOS 52* 51* 50*   ALT 59* 63* 57*   AST 38 37 30           Micro:  Microbiology Results (last 7 days)     Procedure Component Value Units Date/Time    Blood culture (site 2) [[id number]] Collected: 10/27/20 1546    Order Status: Completed Specimen: Blood from Peripheral, Antecubital, Right Updated: 10/31/20 0232     Blood Culture, Routine No Growth to date      No Growth to date      No Growth to date      No Growth to date    Narrative:      Site # 2, aerobic only        Imaging Reviewed:   CXR      Cardiology:    IMPRESSION & PLAN   1. COVID 19 infection with hypoxemia, without pneumonia, much improved   Dizziness, ?volume depletion or eustachian  elated  2. Hypertension  3. GE      Recommendations:   oxygen for sleep  continue remdesivir, day 4/5  Dischargeable Sunday am after remdesivir   antihistamine?     Trend CBC CMP while on remdesivir  Trend ferritin, D dimer     Medical Decision Making during this encounter was  [_] Low Complexity  [x_] Moderate Complexity  [  ] High Complexity

## 2020-10-31 NOTE — PLAN OF CARE
Vital signs, cardiac and lab monitoring. Increase activity as tolerated. Bed alarm on. Watch for falls. Watch for sign and symptoms of bleeding.  Strict I & O. Daily weights.

## 2020-10-31 NOTE — PROGRESS NOTES
[facility] Medicine  Progress Note    Patient Name: David Dunn  MRN: [id number]  Patient Class: IP- Inpatient   Admission Date: 10/27/2020  Length of Stay: 4 days  Attending Physician: Eleazar Grimes MD  Primary Care Provider: Td Lawrence MD        Subjective:     Principal Problem:COVID-19 virus infection        HPI:  David Dunn is a 64 y.o. old male who  has a past medical history of COVID-19, Hyperlipemia, and Hypertension.. The patient presented to [facility] on 10/27/2020 with a primary complaint of COVID POS (DX FRIDAY) and Chills  .   64-year-old male presents with complaints shortness of breath weakness and fatigue.  The patient states about a week ago Monday he began to have generalized weakness and fatigue.       He also subsequently lost his sense of smell and taste he has been having severe frontal headaches nausea but no vomiting an episode of diarrhea.  He also endorses fever and chills and generalized body aches and fatigue.  The patient states today he began to have increased shortness of breath and chest tightness.       He describes his symptoms as severe in severity with No exacerbating or alleviating factors.     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      10/29/2020  Mr Dunn notes dramatic improvement in respiratory symptoms but has severe fatigue     10/30/2020  Pt is much less short of breath but still has significant fatigue and valencia. He desires to complete remdesivir therapy on Danny    10/31/2020  Pt is feeling dizzy all the time but not unsteady when he walks. The above distracts him enough that he is  concerned Re working/driving etc.  Otherwise he is much improved    Interval History: Pt has periods of vertigo/dizziness otherwise he is much improved    Review of Systems   Constitutional: Positive for diaphoresis and fatigue. Negative for chills and fever.   HENT: Negative.    Eyes: Negative.    Respiratory: Negative.    Cardiovascular: Negative.    Gastrointestinal: Negative.    Endocrine: Positive for cold intolerance.   Genitourinary: Negative.    Musculoskeletal: Positive for arthralgias and myalgias.   Skin: Negative.    Neurological: Positive for dizziness and light-headedness.   Hematological: Negative.    Psychiatric/Behavioral: Positive for decreased concentration and dysphoric mood. The patient is nervous/anxious.         Much improved     Objective:     Vital Signs (Most Recent):  Temp: 98.2 °F (36.8 °C) (10/31/20 1550)  Pulse: 67 (10/31/20 0808)  Resp: 19 (10/31/20 1550)  BP: (!) 155/79 (10/31/20 1550)  SpO2: 96 % (10/31/20 1550) Vital Signs (24h Range):  Temp:  [97.5 °F (36.4 °C)-98.2 °F (36.8 °C)] 98.2 °F (36.8 °C)  Pulse:  [56-67] 67  Resp:  [16-19] 19  SpO2:  [95 %-97 %] 96 %  BP: (131-155)/(66-92) 155/79     Weight: 98 kg (216 lb)  Body mass index is 28.5 kg/m².  No intake or output data in the 24 hours ending 10/31/20 1746   Physical Exam  Vitals signs and nursing note reviewed.   Constitutional:       Appearance: Normal appearance.   HENT:      Head: Normocephalic and atraumatic.      Nose: Nose normal.      Mouth/Throat:      Mouth: Mucous membranes are moist.   Eyes:      Extraocular Movements: Extraocular movements intact.      Pupils: Pupils are equal, round, and reactive to light.   Neck:      Musculoskeletal: Normal range of motion and neck supple.   Cardiovascular:      Rate and Rhythm: Normal rate and regular rhythm.   Pulmonary:      Breath sounds: Wheezing and rales present.      Comments: scant  Abdominal:      General: Bowel sounds are normal. There is no distension.       Tenderness: There is no abdominal tenderness.   Skin:     General: Skin is warm.   Neurological:      General: No focal deficit present.      Mental Status: He is alert and oriented to person, place, and time.   Psychiatric:      Comments: anxious         Significant Labs:   Recent Lab Results       10/31/20  0513   10/31/20  0512        Albumin   3.3     Alkaline Phosphatase   50     ALT   57     Anion Gap   10     AST   30     Baso # 0.02       Basophil % 0.2       BILIRUBIN TOTAL   0.6  Comment:  For infants and newborns, interpretation of results should be based  on gestational age, weight and in agreement with clinical  observations.  Premature Infant recommended reference ranges:  Up to 24 hours.............<8.0 mg/dL  Up to 48 hours............<12.0 mg/dL  3-5 days..................<15.0 mg/dL  6-29 days.................<15.0 mg/dL       BUN   29     Calcium   8.9     Chloride   105     CO2   21     Creatinine   0.8     D-Dimer   0.28  Comment:  <0.50 ug/mL FEU cut-off value for exclusion of venous thromboembolism.     Differential Method Automated       eGFR if    >60.0     eGFR if non    >60.0  Comment:  Calculation used to obtain the estimated glomerular filtration  rate (eGFR) is the CKD-EPI equation.        Eos # 0.0       Eosinophil % 0.0       Ferritin   444     Glucose   136     Gran # (ANC) 6.3       Gran % 74.0       Hematocrit 40.4       Hemoglobin 13.0       Immature Grans (Abs) 0.04  Comment:  Mild elevation in immature granulocytes is non specific and   can be seen in a variety of conditions including stress response,   acute inflammation, trauma and pregnancy. Correlation with other   laboratory and clinical findings is essential.         Immature Granulocytes 0.5       Lymph # 1.4       Lymph % 16.1       Magnesium   2.1     MCH 29.0       MCHC 32.2       MCV 90       Mono # 0.8       Mono % 9.2       MPV 9.8       nRBC 0       Phosphorus   3.9     Platelets 229        Potassium   4.2     PROTEIN TOTAL   6.2     RBC 4.48       RDW 12.0       Sodium   136     WBC 8.56             Significant Imaging: I have reviewed all pertinent imaging results/findings within the past 24 hours.      Assessment/Plan:   10/31/2020  A)  C/O dizziness or vertigo without nystagmus or unsteady gait -no throat finding and only serous otitis  Covid 19 pneumonia-symptoms resolving with treatment  P)  I have added cetirizine and meclizine-scheduled  Likely D/C in AM after the last dose of remdesivir      10/30/2020  A)  Covid 19 pneumonia with hypoxia resolving with treatment  HTN  P)  Continue present therapy         10/29/2020  A)  Covid 19 pneumonia with hypoxia  HTN  Much improved  P)  Continue present therapy  Continue Remdesivir        10/28/2020  A)  Covid 19 pneumonia with hypoxia symptoms improved after treatment. O 2 sat stable on nasal canula  Transaminitis secondary to the above  HTN   P)  O 2  Remdesivir, decadron, convalescent plasma  ID input appreciated           10/27/2020  . COVID Pneumonia with Hypoxia  -discussed Remdesivir fact sheet given to patient  -Oral Decadron 6mg given  -inflammatory Markers as below  -CRP:(0.14), Ferritin:(477) Lactic acid:(1.7), Procalcitonin:(<0.05)   - D-Dimer (0.44)  -Supplemental O2  -albuterol metered-dose inhalers  -Consult ID  -Vitamin therapy with Vitamin C and D  -QTc: 373 millsec  -LMWH renal dose   No notes have been filed under this hospital service.  Service: Hospital Medicine    VTE Risk Mitigation (From admission, onward)         Ordered     enoxaparin injection 40 mg  Every 24 hours      10/27/20 2220     IP VTE HIGH RISK PATIENT  Once      10/27/20 2025                Discharge Planning   BRENT:      Code Status: Full Code   Is the patient medically ready for discharge?:     Reason for patient still in hospital (select all that apply): Treatment  Discharge Plan A: Home                  Eleazar Grimes MD  Department of Hospital Medicine    [facility]

## 2020-10-31 NOTE — SUBJECTIVE & OBJECTIVE
Interval History: Pt has periods of vertigo/dizziness otherwise he is much improved    Review of Systems   Constitutional: Positive for diaphoresis and fatigue. Negative for chills and fever.   HENT: Negative.    Eyes: Negative.    Respiratory: Negative.    Cardiovascular: Negative.    Gastrointestinal: Negative.    Endocrine: Positive for cold intolerance.   Genitourinary: Negative.    Musculoskeletal: Positive for arthralgias and myalgias.   Skin: Negative.    Neurological: Positive for dizziness and light-headedness.   Hematological: Negative.    Psychiatric/Behavioral: Positive for decreased concentration and dysphoric mood. The patient is nervous/anxious.         Much improved     Objective:     Vital Signs (Most Recent):  Temp: 98.2 °F (36.8 °C) (10/31/20 1550)  Pulse: 67 (10/31/20 0808)  Resp: 19 (10/31/20 1550)  BP: (!) 155/79 (10/31/20 1550)  SpO2: 96 % (10/31/20 1550) Vital Signs (24h Range):  Temp:  [97.5 °F (36.4 °C)-98.2 °F (36.8 °C)] 98.2 °F (36.8 °C)  Pulse:  [56-67] 67  Resp:  [16-19] 19  SpO2:  [95 %-97 %] 96 %  BP: (131-155)/(66-92) 155/79     Weight: 98 kg (216 lb)  Body mass index is 28.5 kg/m².  No intake or output data in the 24 hours ending 10/31/20 1746   Physical Exam  Vitals signs and nursing note reviewed.   Constitutional:       Appearance: Normal appearance.   HENT:      Head: Normocephalic and atraumatic.      Nose: Nose normal.      Mouth/Throat:      Mouth: Mucous membranes are moist.   Eyes:      Extraocular Movements: Extraocular movements intact.      Pupils: Pupils are equal, round, and reactive to light.   Neck:      Musculoskeletal: Normal range of motion and neck supple.   Cardiovascular:      Rate and Rhythm: Normal rate and regular rhythm.   Pulmonary:      Breath sounds: Wheezing and rales present.      Comments: scant  Abdominal:      General: Bowel sounds are normal. There is no distension.      Tenderness: There is no abdominal tenderness.   Skin:     General: Skin is  warm.   Neurological:      General: No focal deficit present.      Mental Status: He is alert and oriented to person, place, and time.   Psychiatric:      Comments: anxious         Significant Labs:   Recent Lab Results       10/31/20  0513   10/31/20  0512        Albumin   3.3     Alkaline Phosphatase   50     ALT   57     Anion Gap   10     AST   30     Baso # 0.02       Basophil % 0.2       BILIRUBIN TOTAL   0.6  Comment:  For infants and newborns, interpretation of results should be based  on gestational age, weight and in agreement with clinical  observations.  Premature Infant recommended reference ranges:  Up to 24 hours.............<8.0 mg/dL  Up to 48 hours............<12.0 mg/dL  3-5 days..................<15.0 mg/dL  6-29 days.................<15.0 mg/dL       BUN   29     Calcium   8.9     Chloride   105     CO2   21     Creatinine   0.8     D-Dimer   0.28  Comment:  <0.50 ug/mL FEU cut-off value for exclusion of venous thromboembolism.     Differential Method Automated       eGFR if    >60.0     eGFR if non    >60.0  Comment:  Calculation used to obtain the estimated glomerular filtration  rate (eGFR) is the CKD-EPI equation.        Eos # 0.0       Eosinophil % 0.0       Ferritin   444     Glucose   136     Gran # (ANC) 6.3       Gran % 74.0       Hematocrit 40.4       Hemoglobin 13.0       Immature Grans (Abs) 0.04  Comment:  Mild elevation in immature granulocytes is non specific and   can be seen in a variety of conditions including stress response,   acute inflammation, trauma and pregnancy. Correlation with other   laboratory and clinical findings is essential.         Immature Granulocytes 0.5       Lymph # 1.4       Lymph % 16.1       Magnesium   2.1     MCH 29.0       MCHC 32.2       MCV 90       Mono # 0.8       Mono % 9.2       MPV 9.8       nRBC 0       Phosphorus   3.9     Platelets 229       Potassium   4.2     PROTEIN TOTAL   6.2     RBC 4.48       RDW 12.0        Sodium   136     WBC 8.56             Significant Imaging: I have reviewed all pertinent imaging results/findings within the past 24 hours.

## 2020-10-31 NOTE — SUBJECTIVE & OBJECTIVE
Interval History: Pt has noted great improvement in COVID 19 symptoms. He desires to complete full remdesivir therapy    Review of Systems   Constitutional: Positive for diaphoresis and fatigue. Negative for chills and fever.   HENT: Negative.    Eyes: Negative.    Respiratory:        SANTOYO   Cardiovascular: Positive for palpitations.   Gastrointestinal: Negative.    Endocrine: Positive for cold intolerance.   Genitourinary: Negative.    Musculoskeletal: Positive for myalgias.   Skin: Negative.    Neurological: Positive for weakness.   Hematological: Bruises/bleeds easily.   Psychiatric/Behavioral: Positive for decreased concentration. The patient is nervous/anxious.      Objective:     Vital Signs (Most Recent):  Temp: 98.3 °F (36.8 °C) (10/30/20 1520)  Pulse: (!) 58 (10/30/20 1520)  Resp: 20 (10/30/20 1520)  BP: (!) 109/51 (10/30/20 1520)  SpO2: (!) 93 % (10/30/20 1520) Vital Signs (24h Range):  Temp:  [97.8 °F (36.6 °C)-98.4 °F (36.9 °C)] 98.3 °F (36.8 °C)  Pulse:  [56-88] 58  Resp:  [18-20] 20  SpO2:  [92 %-96 %] 93 %  BP: (106-114)/(51-67) 109/51     Weight: 98 kg (216 lb)  Body mass index is 28.5 kg/m².    Intake/Output Summary (Last 24 hours) at 10/30/2020 1942  Last data filed at 10/30/2020 0849  Gross per 24 hour   Intake 610 ml   Output --   Net 610 ml      Physical Exam  Vitals signs and nursing note reviewed.   Constitutional:       Appearance: Normal appearance.   HENT:      Head: Normocephalic and atraumatic.      Nose: Nose normal.      Mouth/Throat:      Mouth: Mucous membranes are moist.   Eyes:      Extraocular Movements: Extraocular movements intact.      Pupils: Pupils are equal, round, and reactive to light.   Neck:      Musculoskeletal: Normal range of motion and neck supple.   Cardiovascular:      Rate and Rhythm: Normal rate and regular rhythm.   Pulmonary:      Effort: Pulmonary effort is normal.      Breath sounds: Wheezing and rales present.      Comments: Bibasilar  scant-reduced  Musculoskeletal: Normal range of motion.   Skin:     General: Skin is warm.   Neurological:      General: No focal deficit present.      Mental Status: He is oriented to person, place, and time.   Psychiatric:         Mood and Affect: Mood normal.         Significant Labs:   Recent Lab Results       10/30/20  0436   10/29/20  2101        Albumin 3.5       Alkaline Phosphatase 51       ALT 63       Anion Gap 6       AST 37       Baso # 0.02       Basophil % 0.3       BILIRUBIN TOTAL 0.7  Comment:  For infants and newborns, interpretation of results should be based  on gestational age, weight and in agreement with clinical  observations.  Premature Infant recommended reference ranges:  Up to 24 hours.............<8.0 mg/dL  Up to 48 hours............<12.0 mg/dL  3-5 days..................<15.0 mg/dL  6-29 days.................<15.0 mg/dL         BUN 28       Calcium 9.0       Chloride 106       CO2 22       Creatinine 0.9       CRP   0.25     D-Dimer 0.29  Comment:  <0.50 ug/mL FEU cut-off value for exclusion of venous thromboembolism.       Differential Method Automated       eGFR if  >60.0       eGFR if non  >60.0  Comment:  Calculation used to obtain the estimated glomerular filtration  rate (eGFR) is the CKD-EPI equation.          Eos # 0.0       Eosinophil % 0.0       Ferritin 495       Glucose 115       Gran # (ANC) 4.9       Gran % 74.2       Hematocrit 41.9       Hemoglobin 13.8       Immature Grans (Abs) 0.03  Comment:  Mild elevation in immature granulocytes is non specific and   can be seen in a variety of conditions including stress response,   acute inflammation, trauma and pregnancy. Correlation with other   laboratory and clinical findings is essential.         Immature Granulocytes 0.5       Lymph # 1.1       Lymph % 16.2       Magnesium 2.2       MCH 29.4       MCHC 32.9       MCV 89       Mono # 0.6       Mono % 8.8       MPV 9.8       nRBC 0        Phosphorus 4.6       Platelets 241       Potassium 4.6       PROTEIN TOTAL 6.7       RBC 4.70       RDW 11.9       Sodium 134       WBC 6.59             Significant Imaging: I have reviewed all pertinent imaging results/findings within the past 24 hours.

## 2020-10-31 NOTE — PROGRESS NOTES
Consult Note  Infectious Disease    Reason for Consult:  COVID    HPI: David Dunn is a  65 y.o. male    presented with low oxygen sat as measured by his wife.   The patient states 10 days  he began to have generalized weakness and fatigue and low grade fever. He was seen by his primary care physician, diagnosed with COVID and given albuterol and medrol dose josh. He denies wheezing , chest pain, sputum production, though he has had cough.  He had transient improvement in temperature but fever escalated 3-4 days ago. He has not been eating or drinking well and today feel worse, with dizziness(not vertigo).  He is currently on 4 liters NC    10/29: afebrile. Sat 90-98% on RA this morning, depending on position. He feels better, coughing minimally, less lightheaded. No diarrhea, chest pain, calf tenderness. His home took damage last pm from [place].  10/30: much improved, showered, shaved, eating well though he has ageusia. Ambulates with out dyspnea though showering did tire him. Would like to receive all 5 days of remdesivir.    COVID 19: 10/27rm  Procalcitonin: normal  Troponin:   BNP :  D-dimer: 0.44, 0.35, 0.29  LDH:  Triglycerides:  Ferritin: 477, 474, 495  azithromycin :   Remdesivir : 10/28  Convalescent plasma:10/28 x2  Blood type:A positive  IL-6:  Tocilizumab:  Recent Labs   Lab 10/27/20  1543 10/27/20  2105 10/29/20  2101   CRP 0.09 0.14 0.25          EXAM & DIAGNOSTICS REVIEWED:   Vitals:     Temp:  [97.8 °F (36.6 °C)-98.4 °F (36.9 °C)]   Temp: 98.1 °F (36.7 °C) (10/30/20 1900)  Pulse: (!) 57 (10/30/20 2057)  Resp: 16 (10/30/20 2057)  BP: 134/66 (10/30/20 1900)  SpO2: 95 % (10/30/20 2057)    Intake/Output Summary (Last 24 hours) at 10/30/2020 2119  Last data filed at 10/30/2020 0849  Gross per 24 hour   Intake 610 ml   Output --   Net 610 ml       General:    Alert and attentive, cooperative,  comfortable  Eyes:  Anicteric,   EOMI  ENT:  No ulcers, exudates, thrush, nares patent, dentition is  excellent  Neck:  supple,    Lungs: Clear, no consolidation, rales, wheezes, rub, sat 95- 98% on RA and does not desaturate with ambulation in the room  Heart:  RRR, no gallop/murmur/rub noted  Abd:  Soft, NT, ND, normal BS, no masses or organomegaly appreciated.  :  Voids  no flank tenderness  Musc:  Joints without effusion, swelling, erythema, synovitis, muscle wasting.   Skin:  No rashes.    Wound:   Neuro:              Alert, attentive, speech fluent, face symmetric, moves all extremities, no focal weakness. Ambulatory  Psych:  Calm, cooperative  Lymphatic:        Extrem: No edema, erythema, phlebitis, cellulitis, warm and well perfused, calves soft and no cords  VAD:   peripheral    Isolation:  COVID    Lines/Tubes/Drains:    General Labs reviewed:  Recent Labs   Lab 10/28/20  0338 10/29/20  0502 10/30/20  0436   WBC 6.72 6.54 6.59   HGB 14.2 13.8* 13.8*   HCT 44.8 43.3 41.9    224 241       Recent Labs   Lab 10/28/20  0338 10/29/20  0502 10/30/20  0436   * 136 134*   K 4.6 4.7 4.6    102 106   CO2 24 24 22*   BUN 22 26* 28*   CREATININE 1.2 0.9 0.9   CALCIUM 8.9 9.6 9.0   PROT 6.9 6.7 6.7   BILITOT 0.8 0.6 0.7   ALKPHOS 54* 52* 51*   ALT 70* 59* 63*   AST 50* 38 37           Micro:  Microbiology Results (last 7 days)     Procedure Component Value Units Date/Time    Blood culture (site 2) [[id number]] Collected: 10/27/20 1543    Order Status: Completed Specimen: Blood from Peripheral, Antecubital, Right Updated: 10/30/20 0232     Blood Culture, Routine No Growth to date      No Growth to date      No Growth to date    Narrative:      Site # 2, aerobic only        Imaging Reviewed:   CXR      Cardiology:    IMPRESSION & PLAN   1. COVID 19 infection with hypoxemia, without pneumonia, much improved   Dizziness, ?volume depletion or eustachian elated  2. Hypertension  3. GE      Recommendations:   oxygen for sleep  continue remdesivir, day 3/5  Dischargeable Sunday am after remdesivir  (d/w him  that he is dischargeable now, but he desires to complete 5 day course)     Trend CBC CMP while on remdesivir  Trend ferritin, D dimer     Medical Decision Making during this encounter was  [_] Low Complexity  [x_] Moderate Complexity  [  ] High Complexity

## 2020-10-31 NOTE — PROGRESS NOTES
[facility] Medicine  Progress Note    Patient Name: David Dunn  MRN: [id number]  Patient Class: IP- Inpatient   Admission Date: 10/27/2020  Length of Stay: 3 days  Attending Physician: Eleazar Grimes MD  Primary Care Provider: Td Lawrence MD        Subjective:     Principal Problem:COVID-19 virus infection        HPI:  David Dunn is a 64 y.o. old male who  has a past medical history of COVID-19, Hyperlipemia, and Hypertension.. The patient presented to [facility] on 10/27/2020 with a primary complaint of COVID POS (DX FRIDAY) and Chills  .   64-year-old male presents with complaints shortness of breath weakness and fatigue.  The patient states about a week ago Monday he began to have generalized weakness and fatigue.       He also subsequently lost his sense of smell and taste he has been having severe frontal headaches nausea but no vomiting an episode of diarrhea.  He also endorses fever and chills and generalized body aches and fatigue.  The patient states today he began to have increased shortness of breath and chest tightness.       He describes his symptoms as severe in severity with No exacerbating or alleviating factors.     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      10/29/2020  Mr Dunn notes dramatic improvement in respiratory symptoms but has severe fatigue     10/30/2020  Pt is much less short of breath but still has significant fatigue and valencia. He desires to complete remdesivir therapy on Sunday    Interval History: Much improved SOB and VALENCIA but he has periods of extreme fatigue    Review of Systems   Constitutional:  Positive for fatigue.   HENT: Negative.    Eyes: Negative.    Respiratory: Negative.    Cardiovascular: Negative.    Gastrointestinal: Negative.    Endocrine: Positive for cold intolerance.   Genitourinary: Negative.    Musculoskeletal: Positive for myalgias.   Skin: Negative.    Neurological: Positive for weakness.   Hematological: Bruises/bleeds easily.   Psychiatric/Behavioral: Positive for decreased concentration.     Objective:     Vital Signs (Most Recent):  Temp: 98.3 °F (36.8 °C) (10/30/20 1520)  Pulse: (!) 58 (10/30/20 1520)  Resp: 20 (10/30/20 1520)  BP: (!) 109/51 (10/30/20 1520)  SpO2: (!) 93 % (10/30/20 1520) Vital Signs (24h Range):  Temp:  [97.8 °F (36.6 °C)-98.4 °F (36.9 °C)] 98.3 °F (36.8 °C)  Pulse:  [56-88] 58  Resp:  [18-20] 20  SpO2:  [92 %-96 %] 93 %  BP: (106-114)/(51-67) 109/51     Weight: 98 kg (216 lb)  Body mass index is 28.5 kg/m².    Intake/Output Summary (Last 24 hours) at 10/30/2020 1953  Last data filed at 10/30/2020 0849  Gross per 24 hour   Intake 610 ml   Output --   Net 610 ml      Physical Exam  Vitals signs and nursing note reviewed.   Constitutional:       General: He is not in acute distress.     Appearance: Normal appearance. He is not ill-appearing.   HENT:      Head: Normocephalic and atraumatic.      Nose: Nose normal.      Mouth/Throat:      Mouth: Mucous membranes are moist.   Eyes:      Extraocular Movements: Extraocular movements intact.      Pupils: Pupils are equal, round, and reactive to light.   Neck:      Musculoskeletal: Normal range of motion and neck supple.   Cardiovascular:      Rate and Rhythm: Normal rate and regular rhythm.   Pulmonary:      Effort: Pulmonary effort is normal.      Breath sounds: Wheezing and rales present.      Comments: reduced  Abdominal:      General: Bowel sounds are normal. There is no distension.      Tenderness: There is no abdominal tenderness. There is no guarding.   Musculoskeletal: Normal range of motion.   Skin:     General:  Skin is warm.   Neurological:      General: No focal deficit present.      Mental Status: He is alert and oriented to person, place, and time.   Psychiatric:         Mood and Affect: Mood normal.         Significant Labs:   Recent Lab Results       10/30/20  0436   10/29/20  2101        Albumin 3.5       Alkaline Phosphatase 51       ALT 63       Anion Gap 6       AST 37       Baso # 0.02       Basophil % 0.3       BILIRUBIN TOTAL 0.7  Comment:  For infants and newborns, interpretation of results should be based  on gestational age, weight and in agreement with clinical  observations.  Premature Infant recommended reference ranges:  Up to 24 hours.............<8.0 mg/dL  Up to 48 hours............<12.0 mg/dL  3-5 days..................<15.0 mg/dL  6-29 days.................<15.0 mg/dL         BUN 28       Calcium 9.0       Chloride 106       CO2 22       Creatinine 0.9       CRP   0.25     D-Dimer 0.29  Comment:  <0.50 ug/mL FEU cut-off value for exclusion of venous thromboembolism.       Differential Method Automated       eGFR if  >60.0       eGFR if non  >60.0  Comment:  Calculation used to obtain the estimated glomerular filtration  rate (eGFR) is the CKD-EPI equation.          Eos # 0.0       Eosinophil % 0.0       Ferritin 495       Glucose 115       Gran # (ANC) 4.9       Gran % 74.2       Hematocrit 41.9       Hemoglobin 13.8       Immature Grans (Abs) 0.03  Comment:  Mild elevation in immature granulocytes is non specific and   can be seen in a variety of conditions including stress response,   acute inflammation, trauma and pregnancy. Correlation with other   laboratory and clinical findings is essential.         Immature Granulocytes 0.5       Lymph # 1.1       Lymph % 16.2       Magnesium 2.2       MCH 29.4       MCHC 32.9       MCV 89       Mono # 0.6       Mono % 8.8       MPV 9.8       nRBC 0       Phosphorus 4.6       Platelets 241       Potassium 4.6       PROTEIN  TOTAL 6.7       RBC 4.70       RDW 11.9       Sodium 134       WBC 6.59             Significant Imaging: I have reviewed all pertinent imaging results/findings within the past 24 hours.      Assessment/Plan:   10/30/2020  A)  Covid 19 pneumonia with hypoxia resolving with treatment  HTN  P)  Continue present therapy       10/29/2020  A)  Covid 19 pneumonia with hypoxia  HTN  Much improved  P)  Continue present therapy  Continue Remdesivir        10/28/2020  A)  Covid 19 pneumonia with hypoxia symptoms improved after treatment. O 2 sat stable on nasal canula  Transaminitis secondary to the above  HTN   P)  O 2  Remdesivir, decadron, convalescent plasma  ID input appreciated           10/27/2020  . COVID Pneumonia with Hypoxia  -discussed Remdesivir fact sheet given to patient  -Oral Decadron 6mg given  -inflammatory Markers as below  -CRP:(0.14), Ferritin:(477) Lactic acid:(1.7), Procalcitonin:(<0.05)   - D-Dimer (0.44)  -Supplemental O2  -albuterol metered-dose inhalers  -Consult ID  -Vitamin therapy with Vitamin C and D  -QTc: 373 millsec  -LMWH renal dose   No notes have been filed under this hospital service.  Service: Hospital Medicine    VTE Risk Mitigation (From admission, onward)         Ordered     enoxaparin injection 40 mg  Every 24 hours      10/27/20 2220     IP VTE HIGH RISK PATIENT  Once      10/27/20 2025                Discharge Planning   BRENT:      Code Status: Full Code   Is the patient medically ready for discharge?:     Reason for patient still in hospital (select all that apply): Treatment and Consult recommendations  Discharge Plan A: Home                  Eleazar Grimes MD  Department of Hospital Medicine   [facility]

## 2020-10-31 NOTE — RESPIRATORY THERAPY
10/30/20 2057   Patient Assessment/Suction   Level of Consciousness (AVPU) alert   Respiratory Effort Unlabored   Expansion/Accessory Muscles/Retractions expansion symmetric;no retractions;no use of accessory muscles   All Lung Fields Breath Sounds clear   Rhythm/Pattern, Respiratory no shortness of breath reported;pattern regular;unlabored   PRE-TX-O2   O2 Device (Oxygen Therapy) room air   SpO2 95 %   Pulse Oximetry Type Intermittent   $ Pulse Oximetry - Multiple Charge Pulse Oximetry - Multiple   Pulse (!) 57   Resp 16   Inhaler   $ Inhaler Charges MDI (Metered Dose Inahler) Treatment;Given With Spacer   Daily Review of Necessity (Inhaler) completed   Respiratory Treatment Status (Inhaler) given   Treatment Route (Inhaler) spacer/holding chamber   Patient Position (Inhaler) sitting on edge of bed   Post Treatment Assessment (Inhaler) breath sounds unchanged   Signs of Intolerance (Inhaler) none   Breath Sounds Post-Respiratory Treatment   Throughout All Fields Post-Treatment All Fields   Throughout All Fields Post-Treatment no change   Post-treatment Heart Rate (beats/min) 63   Post-treatment Resp Rate (breaths/min) 16   Respiratory Interventions   Cough And Deep Breathing done with encouragement   Breathing Techniques/Airway Clearance deep/controlled cough encouraged;diaphragmatic breathing promoted   Education   $ Education Bronchodilator;Other (see comment);15 min  (mdi,deep diaphragmatic breathing exercises)   Respiratory Evaluation   $ Care Plan Tech Time 15 min   Evaluation For   (care plan)

## 2020-11-01 VITALS
BODY MASS INDEX: 28.63 KG/M2 | OXYGEN SATURATION: 98 % | HEART RATE: 65 BPM | WEIGHT: 216 LBS | SYSTOLIC BLOOD PRESSURE: 131 MMHG | TEMPERATURE: 98 F | HEIGHT: 73 IN | RESPIRATION RATE: 16 BRPM | DIASTOLIC BLOOD PRESSURE: 82 MMHG

## 2020-11-01 DIAGNOSIS — U07.1 COVID-19 VIRUS DETECTED: ICD-10-CM

## 2020-11-01 LAB
ALBUMIN SERPL BCP-MCNC: 3.3 G/DL (ref 3.5–5.2)
ALP SERPL-CCNC: 48 U/L (ref 55–135)
ALT SERPL W/O P-5'-P-CCNC: 66 U/L (ref 10–44)
ANION GAP SERPL CALC-SCNC: 9 MMOL/L (ref 8–16)
AST SERPL-CCNC: 35 U/L (ref 10–40)
BASOPHILS # BLD AUTO: 0.03 K/UL (ref 0–0.2)
BASOPHILS NFR BLD: 0.4 % (ref 0–1.9)
BILIRUB SERPL-MCNC: 0.5 MG/DL (ref 0.1–1)
BUN SERPL-MCNC: 27 MG/DL (ref 8–23)
CALCIUM SERPL-MCNC: 8.9 MG/DL (ref 8.7–10.5)
CHLORIDE SERPL-SCNC: 105 MMOL/L (ref 95–110)
CO2 SERPL-SCNC: 24 MMOL/L (ref 23–29)
CREAT SERPL-MCNC: 0.8 MG/DL (ref 0.5–1.4)
D DIMER PPP IA.FEU-MCNC: 0.29 UG/ML FEU
DIFFERENTIAL METHOD: ABNORMAL
EOSINOPHIL # BLD AUTO: 0 K/UL (ref 0–0.5)
EOSINOPHIL NFR BLD: 0 % (ref 0–8)
ERYTHROCYTE [DISTWIDTH] IN BLOOD BY AUTOMATED COUNT: 12 % (ref 11.5–14.5)
EST. GFR  (AFRICAN AMERICAN): >60 ML/MIN/1.73 M^2
EST. GFR  (NON AFRICAN AMERICAN): >60 ML/MIN/1.73 M^2
FERRITIN SERPL-MCNC: 462 NG/ML (ref 20–300)
GLUCOSE SERPL-MCNC: 113 MG/DL (ref 70–110)
HCT VFR BLD AUTO: 39.5 % (ref 40–54)
HGB BLD-MCNC: 13 G/DL (ref 14–18)
IMM GRANULOCYTES # BLD AUTO: 0.08 K/UL (ref 0–0.04)
IMM GRANULOCYTES NFR BLD AUTO: 1 % (ref 0–0.5)
LYMPHOCYTES # BLD AUTO: 1.3 K/UL (ref 1–4.8)
LYMPHOCYTES NFR BLD: 16.1 % (ref 18–48)
MAGNESIUM SERPL-MCNC: 2.1 MG/DL (ref 1.6–2.6)
MCH RBC QN AUTO: 29.8 PG (ref 27–31)
MCHC RBC AUTO-ENTMCNC: 32.9 G/DL (ref 32–36)
MCV RBC AUTO: 91 FL (ref 82–98)
MONOCYTES # BLD AUTO: 0.6 K/UL (ref 0.3–1)
MONOCYTES NFR BLD: 7.7 % (ref 4–15)
NEUTROPHILS # BLD AUTO: 6.2 K/UL (ref 1.8–7.7)
NEUTROPHILS NFR BLD: 74.8 % (ref 38–73)
NRBC BLD-RTO: 0 /100 WBC
PHOSPHATE SERPL-MCNC: 3.9 MG/DL (ref 2.7–4.5)
PLATELET # BLD AUTO: 238 K/UL (ref 150–350)
PMV BLD AUTO: 9.9 FL (ref 9.2–12.9)
POTASSIUM SERPL-SCNC: 4.5 MMOL/L (ref 3.5–5.1)
PROT SERPL-MCNC: 6.1 G/DL (ref 6–8.4)
RBC # BLD AUTO: 4.36 M/UL (ref 4.6–6.2)
SODIUM SERPL-SCNC: 138 MMOL/L (ref 136–145)
WBC # BLD AUTO: 8.31 K/UL (ref 3.9–12.7)

## 2020-11-01 PROCEDURE — 85379 FIBRIN DEGRADATION QUANT: CPT

## 2020-11-01 PROCEDURE — 25000003 PHARM REV CODE 250: Performed by: NURSE PRACTITIONER

## 2020-11-01 PROCEDURE — 25000003 PHARM REV CODE 250: Performed by: INTERNAL MEDICINE

## 2020-11-01 PROCEDURE — 83735 ASSAY OF MAGNESIUM: CPT

## 2020-11-01 PROCEDURE — 82728 ASSAY OF FERRITIN: CPT

## 2020-11-01 PROCEDURE — 36415 COLL VENOUS BLD VENIPUNCTURE: CPT

## 2020-11-01 PROCEDURE — 84100 ASSAY OF PHOSPHORUS: CPT

## 2020-11-01 PROCEDURE — 85025 COMPLETE CBC W/AUTO DIFF WBC: CPT

## 2020-11-01 PROCEDURE — 25000242 PHARM REV CODE 250 ALT 637 W/ HCPCS: Performed by: NURSE PRACTITIONER

## 2020-11-01 PROCEDURE — 63600175 PHARM REV CODE 636 W HCPCS: Performed by: NURSE PRACTITIONER

## 2020-11-01 PROCEDURE — 80053 COMPREHEN METABOLIC PANEL: CPT

## 2020-11-01 RX ORDER — MECLIZINE HYDROCHLORIDE 25 MG/1
25 TABLET ORAL 3 TIMES DAILY PRN
Qty: 45 TABLET | Refills: 0 | Status: SHIPPED | OUTPATIENT
Start: 2020-11-01 | End: 2024-02-02

## 2020-11-01 RX ORDER — ALBUTEROL SULFATE 90 UG/1
2 AEROSOL, METERED RESPIRATORY (INHALATION) EVERY 6 HOURS PRN
Qty: 1 G | Refills: 1 | Status: SHIPPED | OUTPATIENT
Start: 2020-11-01 | End: 2024-02-02

## 2020-11-01 RX ORDER — GUAIFENESIN/DEXTROMETHORPHAN 100-10MG/5
10 SYRUP ORAL EVERY 4 HOURS PRN
Refills: 0 | COMMUNITY
Start: 2020-11-01 | End: 2020-11-11

## 2020-11-01 RX ORDER — CETIRIZINE HYDROCHLORIDE 10 MG/1
10 TABLET ORAL DAILY
Qty: 30 TABLET | Refills: 0 | Status: SHIPPED | OUTPATIENT
Start: 2020-11-01 | End: 2024-02-02

## 2020-11-01 RX ORDER — DEXAMETHASONE 6 MG/1
6 TABLET ORAL DAILY
Qty: 5 TABLET | Refills: 0 | Status: SHIPPED | OUTPATIENT
Start: 2020-11-01 | End: 2020-11-06

## 2020-11-01 RX ADMIN — TELMISARTAN 40 MG: 40 TABLET ORAL at 08:11

## 2020-11-01 RX ADMIN — NEBIVOLOL HYDROCHLORIDE 10 MG: 10 TABLET ORAL at 08:11

## 2020-11-01 RX ADMIN — CETIRIZINE HYDROCHLORIDE 10 MG: 10 TABLET, FILM COATED ORAL at 08:11

## 2020-11-01 RX ADMIN — AMLODIPINE BESYLATE 10 MG: 5 TABLET ORAL at 08:11

## 2020-11-01 RX ADMIN — MECLIZINE HYDROCHLORIDE 25 MG: 12.5 TABLET ORAL at 08:11

## 2020-11-01 RX ADMIN — REMDESIVIR 100 MG: 100 INJECTION, POWDER, LYOPHILIZED, FOR SOLUTION INTRAVENOUS at 05:11

## 2020-11-01 RX ADMIN — THERA TABS 1 TABLET: TAB at 08:11

## 2020-11-01 RX ADMIN — DEXAMETHASONE 6 MG: 2 TABLET ORAL at 08:11

## 2020-11-01 NOTE — DISCHARGE SUMMARY
Critical access hospital Medicine  Discharge Summary      Patient Name: David Dunn  MRN: [id number]  Admission Date: 10/27/2020  Hospital Length of Stay: 5 days  Discharge Date and Time:  11/01/2020 7:41 AM  Attending Physician: Eleazar Grimes MD   Discharging Provider: Eleazar Grimes MD  Primary Care Provider: Td Lawrence MD      HPI:   David Dunn is a 64 y.o. old male who  has a past medical history of COVID-19, Hyperlipemia, and Hypertension.. The patient presented to [facility] on 10/27/2020 with a primary complaint of COVID POS (DX FRIDAY) and Chills  .   64-year-old male presents with complaints shortness of breath weakness and fatigue.  The patient states about a week ago Monday he began to have generalized weakness and fatigue.       He also subsequently lost his sense of smell and taste he has been having severe frontal headaches nausea but no vomiting an episode of diarrhea.  He also endorses fever and chills and generalized body aches and fatigue.  The patient states today he began to have increased shortness of breath and chest tightness.       He describes his symptoms as severe in severity with No exacerbating or alleviating factors.     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      10/29/2020  Mr Dunn notes dramatic improvement in respiratory symptoms but has severe fatigue     10/30/2020  Pt is much less short of breath but still has significant fatigue and valencia. He desires to complete remdesivir therapy on Danny    10/31/2020  Pt is feeling dizzy all the time but not unsteady when he walks. The above distracts him  enough that he is concerned Re working/driving etc.  Otherwise he is much improved    11/1/2020  Mr Dunn is feeling better after cetirizine/meclizine with much reduced dizziness. He is able to preform AODL's without dyspnea or desaturation. Pt is aware that he and his family must follow the post covid 19 admit instructions with isolation as indicated  JEFFREY: no general complaint, mild dizziness but improved, SANTOYO with vigorous exertion but is still very fatigued at the end of the day. Otherwise negative( out of 10)  PE: in no distress, alert, HEENT negative labyrinthine maneuvers , neck supple no use of accessory muscles, lungs scant basilar crackles, heart S 4 S 1 S 2 no S 3 RRR, abdomen BS+ non tender Ext without CC or E pulses 2 +, skin negative, Neuro no lateralizing findings         Consults:   Consults (From admission, onward)        Status Ordering Provider     Inpatient consult to Infectious Diseases  Once     Provider:  Nara Mendez MD    Acknowledged CARINA JAMES     Inpatient consult to Internal Medicine  Once     Provider:  Vance Martinez MD    Acknowledged KVNG SMITH     Pharmacy Remdesivir Consult  Once     Provider:  (Not yet assigned)    Acknowledged CARINA JAMES          No new Assessment & Plan notes have been filed under this hospital service since the last note was generated.  Service: Hospital Medicine    Final Active Diagnoses:    Diagnosis Date Noted POA    PRINCIPAL PROBLEM:  COVID-19 virus infection [U07.1] 10/27/2020 Yes    Hypoxia [R09.02]  Yes    GE (obstructive sleep apnea) [G47.33]  Yes    CKD (chronic kidney disease) stage 3, GFR 30-59 ml/min [N18.30]  Yes    Hypertension [I10]  Yes    Hyperlipemia [E78.5]  Yes      Problems Resolved During this Admission:       Discharged Condition: good    Disposition: Home or Self Care    Follow Up:  Follow-up Information     Td Lawrence MD In 2 weeks.    Specialty: Internal Medicine  Contact information:  [address]    [address]  [place] [address]  [phone]             Nara Mendez MD In 1 month.    Specialty: Infectious Diseases  Why: if not improved  Contact information:  [address] [facility]  [address]  [place] [address]  [phone]             Darek Cartwright MD.    Specialties: Vascular Neurology, Neurology  Why: if vertigo persists  Contact information:  [address] [facility]  [address]  [place] [address]  [phone]                 Patient Instructions:      Comprehensive metabolic panel   Standing Status: Future Standing Exp. Date: 12/31/21     CBC auto differential   Standing Status: Future Standing Exp. Date: 12/31/21     Diet Cardiac     Notify your health care provider if you experience any of the following:  temperature >100.4     Notify your health care provider if you experience any of the following:  persistent nausea and vomiting or diarrhea     Notify your health care provider if you experience any of the following:  severe uncontrolled pain     Notify your health care provider if you experience any of the following:  redness, tenderness, or signs of infection (pain, swelling, redness, odor or green/yellow discharge around incision site)     Notify your health care provider if you experience any of the following:  difficulty breathing or increased cough     Notify your health care provider if you experience any of the following:  severe persistent headache     Notify your health care provider if you experience any of the following:  worsening rash     Notify your health care provider if you experience any of the following:  persistent dizziness, light-headedness, or visual disturbances     Notify your health care provider if you experience any of the following:  increased confusion or weakness     Activity as tolerated       Significant Diagnostic Studies: Labs:   BMP:   Recent Labs   Lab 10/31/20  0512 11/01/20  0450   * 113*    138   K 4.2 4.5    105   CO2 21* 24   BUN 29* 27*   CREATININE  0.8 0.8   CALCIUM 8.9 8.9   MG 2.1 2.1   , CMP   Recent Labs   Lab 10/31/20  0512 11/01/20  0450    138   K 4.2 4.5    105   CO2 21* 24   * 113*   BUN 29* 27*   CREATININE 0.8 0.8   CALCIUM 8.9 8.9   PROT 6.2 6.1   ALBUMIN 3.3* 3.3*   BILITOT 0.6 0.5   ALKPHOS 50* 48*   AST 30 35   ALT 57* 66*   ANIONGAP 10 9   ESTGFRAFRICA >60.0 >60.0   EGFRNONAA >60.0 >60.0   , CBC   Recent Labs   Lab 10/31/20  0513 11/01/20  0450   WBC 8.56 8.31   HGB 13.0* 13.0*   HCT 40.4 39.5*    238   , INR No results found for: INR, PROTIME, Troponin   Recent Labs   Lab 10/27/20  1543   TROPONINI <0.030    and All labs within the past 24 hours have been reviewed  Microbiology:   Blood Culture   Lab Results   Component Value Date    LABBLOO No Growth to date 10/27/2020    LABBLOO No Growth to date 10/27/2020    LABBLOO No Growth to date 10/27/2020    LABBLOO No Growth to date 10/27/2020    LABBLOO No Growth to date 10/27/2020       Pending Diagnostic Studies:     None         Medications:  Reconciled Home Medications:      Medication List      START taking these medications    albuterol 90 mcg/actuation inhaler  Commonly known as: PROVENTIL/VENTOLIN HFA  Inhale 2 puffs into the lungs every 6 (six) hours as needed for Wheezing. Rescue     apixaban 2.5 mg Tab  Commonly known as: ELIQUIS  Take 1 tablet (2.5 mg total) by mouth 2 (two) times daily. for 15 days     cetirizine 10 MG tablet  Commonly known as: ZYRTEC  Take 1 tablet (10 mg total) by mouth once daily.     dexAMETHasone 6 MG tablet  Commonly known as: DECADRON  Take 1 tablet (6 mg total) by mouth once daily. for 5 days     dextromethorphan-guaifenesin  mg/5 ml  mg/5 mL liquid  Commonly known as: ROBITUSSIN-DM  Take 10 mLs by mouth every 4 (four) hours as needed.     meclizine 25 mg tablet  Commonly known as: ANTIVERT  Take 1 tablet (25 mg total) by mouth 3 (three) times daily as needed.        CHANGE how you take these medications    amLODIPine 10  MG tablet  Commonly known as: NORVASC  Take 10 mg by mouth nightly.  What changed: Another medication with the same name was removed. Continue taking this medication, and follow the directions you see here.        CONTINUE taking these medications    BYSTOLIC 10 MG Tab  Generic drug: nebivoloL  Take 10 mg by mouth nightly.     ezetimibe 10 mg tablet  Commonly known as: ZETIA  Take 10 mg by mouth nightly.     rosuvastatin 40 MG Tab  Commonly known as: CRESTOR  Take 10 mg by mouth every evening.     telmisartan 80 MG Tab  Commonly known as: MICARDIS  Take 80 mg by mouth nightly.            Indwelling Lines/Drains at time of discharge:   Lines/Drains/Airways     None                 Time spent on the discharge of patient: 33 minutes  Patient was seen and examined on the date of discharge and determined to be suitable for discharge.         Eleazar Grimes MD  Department of Hospital Medicine  [facility]

## 2020-11-01 NOTE — PLAN OF CARE
11/01/20 0812   Final Note   Assessment Type Final Discharge Note   Anticipated Discharge Disposition Home   Post-Acute Status   Discharge Delays None known at this time

## 2020-11-02 LAB — BACTERIA BLD CULT: NORMAL

## 2020-11-04 NOTE — PHYSICIAN QUERY
PT Name: David Dunn  MR #: [id number]     Documentation Clarification      CDS/: Keyonna Friend               Contact information:[phone] or through [employer]    This form is a permanent document in the medical record.     Query Date: November 4, 2020    By submitting this query, we are merely seeking further clarification of documentation. Please utilize your independent clinical judgment when addressing the question(s) below.    The Medical Record reflects the following:    Clinical Findings Location in Medical Records   CKD (chronic kidney disease) stage 3, GFR 30-59 ml/min     Creatinine (mg/dL) 0.8 0.8 0.9 0.9 1.2 1.2  GFR >60 on all draws DC summary      Labs                                                                                Provider, please clarify the diagnosis of _________________:      [   xx Diagnosis is confirmed and additional clinical support/decision-making indicators for the diagnosis include (please specify):   [   ] Above stated diagnosis is not confirmed and/or it has been ruled out   [   ] Above stated diagnosis is not confirmed and/or it has been ruled out, other diagnosis ruled in (please specify):_______________   [   ] Other clarification (please specify): ___________________   [  ] Clinically undetermined         Present on admission (POA) status:   [   xx] Yes (Y)                          [  ] Clinically Undetermined (W)  [   ] No (N)                            [   ] Documentation insufficient to determine if condition is POA (U)

## 2020-11-04 NOTE — PHYSICIAN QUERY
PT Name: David Dunn  MR #: [id number]    Physician Query Form - Heart  Condition Clarification     CDS/: Keyonna Friend               Contact information:[phone] through epic messages  This form is a permanent document in the medical record.     Query Date: November 4, 2020    By submitting this query, we are merely seeking further clarification of documentation. Please utilize your independent clinical judgment when addressing the question(s) below.    The medical record contains the following:  Past Medical History:   CHF (congestive heart failure)    Echo in 8/20 with EF 79%          References: *American Heart Association    The clinical guidelines noted below are only system guidelines, and do not replace the providers clinical judgment.     Provider, please specify the diagnosis associated with above clinical findings  [   ] Acute Systolic Heart Failure - New diagnosis.  EF < 40%  and acute HF symptoms documented     [   ] Acute on Chronic Systolic Heart Failure- Pre-existing systolic HF diagnosis.  EF < 40%  and acute HF symptoms documented   [   ] Chronic Systolic Heart Failure - Pre-existing systolic HF diagnosis.  EF < 40%  without  acute HF symptoms documented    [   ] Acute Diastolic Heart Failure - New diagnosis.  EF > 40%  and acute HF symptoms documented   [   ] Acute on Chronic Diastolic Heart Failure -    Pre-existing diastoic HF diagnosis.  EF > 40%  and acute HF symptoms documented       [   ] Chronic Diastolic Heart Failure - Pre-existing diastolic HF diagnosis.  EF > 40%  without  acute HF symptoms documented   [   ] Acute Combined Systolic and Diastolic Heart Failure    [   ] Acute on Chronic Combined Systolic and Diastolic Heart Failure      [   ] Chronic Combined Systolic and Diastolic Heart Failure   [   ] Other Type of Heart Failure (please specify type):   [   ] Heart Failure Ruled Out   [   ] Other (please specify):   [ x ] Clinically Undetermined

## 2022-06-09 DIAGNOSIS — I10 ESSENTIAL HYPERTENSION, MALIGNANT: ICD-10-CM

## 2022-06-09 DIAGNOSIS — E78.5 HYPERLIPEMIA: Primary | ICD-10-CM

## 2022-07-22 ENCOUNTER — CLINICAL SUPPORT (OUTPATIENT)
Dept: CARDIOLOGY | Facility: HOSPITAL | Age: 67
End: 2022-07-22
Attending: SPECIALIST
Payer: COMMERCIAL

## 2022-07-22 ENCOUNTER — HOSPITAL ENCOUNTER (OUTPATIENT)
Dept: RADIOLOGY | Facility: HOSPITAL | Age: 67
Discharge: HOME | End: 2022-07-22
Attending: SPECIALIST
Payer: COMMERCIAL

## 2022-07-22 DIAGNOSIS — E78.5 HYPERLIPEMIA: ICD-10-CM

## 2022-07-22 DIAGNOSIS — I10 ESSENTIAL HYPERTENSION, MALIGNANT: ICD-10-CM

## 2022-07-22 LAB
CV STRESS BASE HR: 55 BPM
DIASTOLIC BLOOD PRESSURE: 82 MMHG
OHS CV CPX 1 MINUTE RECOVERY HEART RATE: 84 BPM
OHS CV CPX 85 PERCENT MAX PREDICTED HEART RATE MALE: 133
OHS CV CPX MAX PREDICTED HEART RATE: 156
OHS CV CPX PATIENT IS FEMALE: 0
OHS CV CPX PATIENT IS MALE: 1
OHS CV CPX PEAK DIASTOLIC BLOOD PRESSURE: 95 MMHG
OHS CV CPX PEAK HEAR RATE: 89 BPM
OHS CV CPX PEAK RATE PRESSURE PRODUCT: NORMAL
OHS CV CPX PEAK SYSTOLIC BLOOD PRESSURE: 198 MMHG
OHS CV CPX PERCENT MAX PREDICTED HEART RATE ACHIEVED: 57
OHS CV CPX RATE PRESSURE PRODUCT PRESENTING: 7975
STRESS ECHO POST EXERCISE DUR MIN: 3 MINUTES
STRESS ECHO POST EXERCISE DUR SEC: 15 SECONDS
SYSTOLIC BLOOD PRESSURE: 145 MMHG

## 2022-07-22 PROCEDURE — 93018 CV STRESS TEST I&R ONLY: CPT | Mod: ,,, | Performed by: SPECIALIST

## 2022-07-22 PROCEDURE — 93018 NUCLEAR STRESS TEST (CUPID ONLY): ICD-10-PCS | Mod: ,,, | Performed by: SPECIALIST

## 2022-07-22 PROCEDURE — 93016 NUCLEAR STRESS TEST (CUPID ONLY): ICD-10-PCS | Mod: ,,, | Performed by: SPECIALIST

## 2022-07-22 PROCEDURE — A9502 TC99M TETROFOSMIN: HCPCS

## 2022-07-22 PROCEDURE — 93017 CV STRESS TEST TRACING ONLY: CPT

## 2022-07-22 PROCEDURE — 93016 CV STRESS TEST SUPVJ ONLY: CPT | Mod: ,,, | Performed by: SPECIALIST

## 2022-07-22 PROCEDURE — 63600175 PHARM REV CODE 636 W HCPCS: Performed by: SPECIALIST

## 2022-07-22 RX ORDER — REGADENOSON 0.08 MG/ML
0.4 INJECTION, SOLUTION INTRAVENOUS
Status: COMPLETED | OUTPATIENT
Start: 2022-07-22 | End: 2022-07-22

## 2022-07-22 RX ADMIN — REGADENOSON 0.4 MG: 0.08 INJECTION, SOLUTION INTRAVENOUS at 10:07

## 2024-02-02 ENCOUNTER — OFFICE VISIT (OUTPATIENT)
Dept: URGENT CARE | Facility: CLINIC | Age: 69
End: 2024-02-02
Payer: COMMERCIAL

## 2024-02-02 VITALS
WEIGHT: 250 LBS | RESPIRATION RATE: 18 BRPM | DIASTOLIC BLOOD PRESSURE: 71 MMHG | HEIGHT: 73 IN | SYSTOLIC BLOOD PRESSURE: 132 MMHG | HEART RATE: 68 BPM | TEMPERATURE: 99 F | OXYGEN SATURATION: 98 % | BODY MASS INDEX: 33.13 KG/M2

## 2024-02-02 DIAGNOSIS — U07.1 COVID-19 VIRUS DETECTED: ICD-10-CM

## 2024-02-02 DIAGNOSIS — U07.1 COVID-19: ICD-10-CM

## 2024-02-02 DIAGNOSIS — R05.9 COUGH, UNSPECIFIED TYPE: Primary | ICD-10-CM

## 2024-02-02 LAB
CTP QC/QA: YES
SARS-COV-2 AG RESP QL IA.RAPID: POSITIVE

## 2024-02-02 PROCEDURE — 87811 SARS-COV-2 COVID19 W/OPTIC: CPT | Mod: QW,S$GLB,, | Performed by: STUDENT IN AN ORGANIZED HEALTH CARE EDUCATION/TRAINING PROGRAM

## 2024-02-02 PROCEDURE — 99204 OFFICE O/P NEW MOD 45 MIN: CPT | Mod: S$GLB,,, | Performed by: STUDENT IN AN ORGANIZED HEALTH CARE EDUCATION/TRAINING PROGRAM

## 2024-02-02 RX ORDER — CETIRIZINE HYDROCHLORIDE 10 MG/1
10 TABLET ORAL DAILY
Qty: 30 TABLET | Refills: 0 | Status: SHIPPED | OUTPATIENT
Start: 2024-02-02

## 2024-02-02 RX ORDER — MECLIZINE HYDROCHLORIDE 25 MG/1
25 TABLET ORAL 3 TIMES DAILY PRN
Qty: 15 TABLET | Refills: 0 | Status: SHIPPED | OUTPATIENT
Start: 2024-02-02

## 2024-02-02 RX ORDER — FLUTICASONE PROPIONATE 50 MCG
2 SPRAY, SUSPENSION (ML) NASAL DAILY
Qty: 15.8 ML | Refills: 0 | Status: SHIPPED | OUTPATIENT
Start: 2024-02-02

## 2024-02-02 RX ORDER — FAMOTIDINE 40 MG/1
40 TABLET, FILM COATED ORAL DAILY
Qty: 10 TABLET | Refills: 0 | Status: SHIPPED | OUTPATIENT
Start: 2024-02-02

## 2024-02-02 RX ORDER — PROMETHAZINE HYDROCHLORIDE AND DEXTROMETHORPHAN HYDROBROMIDE 6.25; 15 MG/5ML; MG/5ML
5 SYRUP ORAL
Qty: 118 ML | Refills: 0 | Status: SHIPPED | OUTPATIENT
Start: 2024-02-02 | End: 2024-02-12

## 2024-02-02 NOTE — PROGRESS NOTES
"Subjective:      Patient ID: David Dunn is a 68 y.o. male.    Vitals:  height is 6' 1" (1.854 m) and weight is 113.4 kg (250 lb). His oral temperature is 99.1 °F (37.3 °C). His blood pressure is 132/71 and his pulse is 68. His respiration is 18 and oxygen saturation is 98%.     Chief Complaint: Fever    Patient is a 68-year-old male with a past medical history of congestive heart failure, coronary artery disease, hypertension, hyperlipidemia, chronic kidney disease, and obstructive sleep apnea who presents to clinic for evaluation of COVID like symptoms.  Patient reports that he has positive sick exposure as his wife was recently diagnosed with COVID.  Patient states that he has taken over-the-counter medications with mild relief to symptoms.  Patient reports that his symptoms began yesterday.    Fever   This is a new problem. The current episode started yesterday. The problem occurs constantly. The problem has been gradually worsening. The maximum temperature noted was 100 to 100.9 F. The temperature was taken using an oral thermometer. Associated symptoms include congestion, coughing, headaches, muscle aches and a sore throat. Pertinent negatives include no abdominal pain, chest pain, diarrhea, ear pain, nausea, rash or vomiting.   Risk factors: sick contacts        Constitution: Positive for fatigue and fever (Temperature max 102° F).   HENT:  Positive for congestion and sore throat. Negative for ear pain and drooling.    Neck: neck negative.   Cardiovascular: Negative.  Negative for chest pain and palpitations.   Eyes: Negative.    Respiratory:  Positive for cough. Negative for chest tightness, sputum production and shortness of breath.    Gastrointestinal: Negative.  Negative for abdominal pain, nausea, vomiting and diarrhea.   Endocrine: negative.   Genitourinary: Negative.    Musculoskeletal:  Positive for muscle ache.   Skin: Negative.  Negative for color change, pale, rash and erythema. "   Allergic/Immunologic: Negative.    Neurological:  Positive for dizziness and headaches. Negative for light-headedness, passing out, disorientation and altered mental status.   Hematologic/Lymphatic: Negative.    Psychiatric/Behavioral: Negative.  Negative for altered mental status, disorientation and confusion.       Objective:     Physical Exam   Constitutional: He is oriented to person, place, and time. He appears well-developed. He is cooperative.  Non-toxic appearance. He does not appear ill. No distress.   HENT:   Head: Normocephalic and atraumatic.   Ears:   Right Ear: Hearing, tympanic membrane, external ear and ear canal normal.   Left Ear: Hearing, tympanic membrane, external ear and ear canal normal.   Nose: Congestion present. No mucosal edema, rhinorrhea or nasal deformity. No epistaxis. Right sinus exhibits no maxillary sinus tenderness and no frontal sinus tenderness. Left sinus exhibits no maxillary sinus tenderness and no frontal sinus tenderness.   Mouth/Throat: Uvula is midline and mucous membranes are normal. Mucous membranes are moist. No trismus in the jaw. Normal dentition. No uvula swelling. Posterior oropharyngeal erythema present. No oropharyngeal exudate. Oropharynx is clear.   Eyes: Conjunctivae and lids are normal. Pupils are equal, round, and reactive to light. Right eye exhibits no discharge. Left eye exhibits no discharge. No scleral icterus.   Neck: Trachea normal and phonation normal. Neck supple. No neck rigidity present.   Cardiovascular: Normal rate, regular rhythm, normal heart sounds and normal pulses.   Pulmonary/Chest: Effort normal and breath sounds normal. No respiratory distress (Unlabored.  Equal rise and fall of chest.  Able speak in full complete sentences.  No adventitious breath sounds noted.). He has no wheezes. He has no rhonchi. He has no rales.   Abdominal: Normal appearance and bowel sounds are normal. He exhibits no distension. Soft. There is no abdominal  tenderness.   Musculoskeletal: Normal range of motion.         General: Normal range of motion.      Cervical back: He exhibits no tenderness.   Lymphadenopathy:     He has no cervical adenopathy.   Neurological: He is alert and oriented to person, place, and time. He exhibits normal muscle tone.   Skin: Skin is warm, dry, intact, not diaphoretic, not pale and no rash. Capillary refill takes 2 to 3 seconds. No erythema   Psychiatric: His speech is normal and behavior is normal. Judgment and thought content normal.   Nursing note and vitals reviewed.      Assessment:     1. Cough, unspecified type    2. COVID-19        Plan:       Cough, unspecified type  -     SARS Coronavirus 2 Antigen, POCT Manual Read    COVID-19    Other orders  -     molnupiravir 200 mg capsule (EUA); Take 4 capsules (800 mg total) by mouth every 12 (twelve) hours. for 5 days  Dispense: 40 capsule; Refill: 0  -     promethazine-dextromethorphan (PROMETHAZINE-DM) 6.25-15 mg/5 mL Syrp; Take 5 mLs by mouth every 4 to 6 hours as needed (Cough).  Dispense: 118 mL; Refill: 0  -     fluticasone propionate (FLONASE) 50 mcg/actuation nasal spray; 2 sprays (100 mcg total) by Each Nostril route once daily.  Dispense: 15.8 mL; Refill: 0  -     cetirizine (ZYRTEC) 10 MG tablet; Take 1 tablet (10 mg total) by mouth once daily.  Dispense: 30 tablet; Refill: 0  -     famotidine (PEPCID) 40 MG tablet; Take 1 tablet (40 mg total) by mouth once daily.  Dispense: 10 tablet; Refill: 0  -     meclizine (ANTIVERT) 25 mg tablet; Take 1 tablet (25 mg total) by mouth 3 (three) times daily as needed for Dizziness.  Dispense: 15 tablet; Refill: 0                Labs:  COVID positive.  Quarantine as directed.  Quarantine information provided to patient.  COVID recommendations provided.  (Vitamin-C 2000 mg daily, vitamin D3 1000 IU daily, Pepcid 40 mg daily, Zyrtec 10 mg daily, zinc 50 mg daily)  Tylenol per package instructions for any pain or fever.  May rotate with  Motrin if unable to control pain or fever along with Tylenol.  Monitor home SpO2 and present to emergency department with readings less than 92%.  Take medications as prescribed.  Assure adequate hydration.  Follow-up with PCP in 1-2 days.  Return to clinic as needed.  To ED for any new or acutely worsening symptoms including but not limited to chest pain, palpitations, shortness of breath, or fever greater than 103° F.  Patient in agreement with plan of care.    DISCLAIMER: Please note that my documentation in this Electronic Healthcare Record was produced using speech recognition software and therefore may contain errors related to that software system.These could include grammar, punctuation and spelling errors or the inclusion/exclusion of phrases that were not intended. Garbled syntax, mangled pronouns, and other bizarre constructions may be attributed to that software system.

## 2024-02-02 NOTE — LETTER
February 2, 2024      [facility] [place]  [address]  [address]  [place] [address]  Phone: [phone]  Fax: [phone]       Patient: David Dunn   YOB: 1955  Date of Visit: 02/02/2024    To Whom It May Concern:    Brooklynn Dunn  was at [facility] on 02/02/2024. The patient may return to work/school on 02/06/2024 with restrictions (5 days of mask wearing to complete 10 day CDC quarantine guideline). If you have any questions or concerns, or if I can be of further assistance, please do not hesitate to contact me.    Sincerely,    Timothy King NP

## 2024-02-05 ENCOUNTER — PATIENT MESSAGE (OUTPATIENT)
Dept: RESEARCH | Facility: HOSPITAL | Age: 69
End: 2024-02-05
Payer: COMMERCIAL

## 2024-07-16 DIAGNOSIS — I95.1 ORTHOSTATIC HYPOTENSION: Primary | ICD-10-CM

## 2024-07-30 ENCOUNTER — TELEPHONE (OUTPATIENT)
Dept: CARDIOLOGY | Facility: HOSPITAL | Age: 69
End: 2024-07-30

## 2024-07-30 NOTE — TELEPHONE ENCOUNTER
Patient advised, [facility]   Will need to register on the first floor at the main entrance.   Patient advised that arrival time is 6:20am.  Patient advised that he may be here about 3.5-4 hours, and may want to bring something to occupy their time, as there will be periods of waiting.    Patient advised, may take his medications prior to testing if you need to.   Advised if he needs to eat to take his medications, please keep it light, like toast and juice.    Patient advised to avoid all caffeine 12 hours prior to testing.  This includes decaf tea and coffee.    Will provide peanut butter crackers for a snack after stress test.  If patient would prefer something else, please bring a snack from home.    Wear comfortable clothing.   No lotions, oils, or powders to the upper chest area. May wear deodorant.    No metal jewelry, buttons, or zippers to the upper body.  Patient verbalizes understanding of instructions.

## 2024-07-31 ENCOUNTER — HOSPITAL ENCOUNTER (OUTPATIENT)
Dept: CARDIOLOGY | Facility: HOSPITAL | Age: 69
Discharge: HOME | End: 2024-07-31
Attending: EMERGENCY MEDICINE
Payer: MEDICARE

## 2024-07-31 ENCOUNTER — HOSPITAL ENCOUNTER (OUTPATIENT)
Dept: RADIOLOGY | Facility: HOSPITAL | Age: 69
Discharge: HOME | End: 2024-07-31
Attending: EMERGENCY MEDICINE
Payer: MEDICARE

## 2024-07-31 VITALS — WEIGHT: 250 LBS | BODY MASS INDEX: 33.13 KG/M2 | HEIGHT: 73 IN

## 2024-07-31 DIAGNOSIS — I95.1 ORTHOSTATIC HYPOTENSION: ICD-10-CM

## 2024-07-31 LAB
AORTIC ROOT ANNULUS: 3.8 CM
AORTIC VALVE CUSP SEPERATION: 1.6 CM
AV INDEX (PROSTH): 0.62
AV MEAN GRADIENT: 5 MMHG
AV PEAK GRADIENT: 9 MMHG
AV VALVE AREA BY VELOCITY RATIO: 1.98 CM²
AV VALVE AREA: 1.94 CM²
AV VELOCITY RATIO: 0.63
BSA FOR ECHO PROCEDURE: 2.42 M2
CV ECHO LV RWT: 0.69 CM
CV PHARM DOSE: 0.4 MG
CV STRESS BASE HR: 62 BPM
DIASTOLIC BLOOD PRESSURE: 80 MMHG
DOP CALC AO PEAK VEL: 1.52 M/S
DOP CALC AO VTI: 34.3 CM
DOP CALC LVOT AREA: 3.1 CM2
DOP CALC LVOT DIAMETER: 2 CM
DOP CALC LVOT PEAK VEL: 0.96 M/S
DOP CALC LVOT STROKE VOLUME: 66.57 CM3
DOP CALCLVOT PEAK VEL VTI: 21.2 CM
E WAVE DECELERATION TIME: 243 MSEC
E/A RATIO: 0.74
E/E' RATIO: 6.71 M/S
ECHO LV POSTERIOR WALL: 1.37 CM (ref 0.6–1.1)
EJECTION FRACTION- HIGH: 65 %
END DIASTOLIC INDEX-HIGH: 153 ML/M2
END DIASTOLIC INDEX-LOW: 93 ML/M2
END SYSTOLIC INDEX-HIGH: 71 ML/M2
END SYSTOLIC INDEX-LOW: 31 ML/M2
FRACTIONAL SHORTENING: 34 % (ref 28–44)
INTERVENTRICULAR SEPTUM: 1.31 CM (ref 0.6–1.1)
IVRT: 111 MSEC
LEFT ATRIUM SIZE: 4.2 CM
LEFT INTERNAL DIMENSION IN SYSTOLE: 2.63 CM (ref 2.1–4)
LEFT VENTRICLE DIASTOLIC VOLUME INDEX: 29.54 ML/M2
LEFT VENTRICLE DIASTOLIC VOLUME: 70 ML
LEFT VENTRICLE MASS INDEX: 82 G/M2
LEFT VENTRICLE SYSTOLIC VOLUME INDEX: 10.7 ML/M2
LEFT VENTRICLE SYSTOLIC VOLUME: 25.3 ML
LEFT VENTRICULAR INTERNAL DIMENSION IN DIASTOLE: 4 CM (ref 3.5–6)
LEFT VENTRICULAR MASS: 195.35 G
LV LATERAL E/E' RATIO: 5.18 M/S
LV SEPTAL E/E' RATIO: 9.5 M/S
LVED V (TEICH): 70 ML
LVES V (TEICH): 25.3 ML
LVOT MG: 2 MMHG
LVOT MV: 0.67 CM/S
MV PEAK A VEL: 0.77 M/S
MV PEAK E VEL: 0.57 M/S
MV STENOSIS PRESSURE HALF TIME: 53 MS
MV VALVE AREA P 1/2 METHOD: 4.15 CM2
NUC REST DIASTOLIC VOLUME INDEX: 76
NUC REST EJECTION FRACTION: 58
NUC REST SYSTOLIC VOLUME INDEX: 32
NUC STRESS DIASTOLIC VOLUME INDEX: 88
NUC STRESS EJECTION FRACTION: 69 %
NUC STRESS SYSTOLIC VOLUME INDEX: 26
OHS CV CPX 1 MINUTE RECOVERY HEART RATE: 87 BPM
OHS CV CPX 85 PERCENT MAX PREDICTED HEART RATE MALE: 131
OHS CV CPX MAX PREDICTED HEART RATE: 154
OHS CV CPX PATIENT IS FEMALE: 0
OHS CV CPX PATIENT IS MALE: 1
OHS CV CPX PEAK DIASTOLIC BLOOD PRESSURE: 76 MMHG
OHS CV CPX PEAK HEAR RATE: 87 BPM
OHS CV CPX PEAK RATE PRESSURE PRODUCT: NORMAL
OHS CV CPX PEAK SYSTOLIC BLOOD PRESSURE: 136 MMHG
OHS CV CPX PERCENT MAX PREDICTED HEART RATE ACHIEVED: 56
OHS CV CPX RATE PRESSURE PRODUCT PRESENTING: 8680
OHS CV RV/LV RATIO: 0.7 CM
PISA TR MAX VEL: 2.23 M/S
RA PRESSURE ESTIMATED: 3 MMHG
RETIRED EF AND QEF - SEE NOTES: 53 %
RIGHT VENTRICULAR END-DIASTOLIC DIMENSION: 2.79 CM
RV TB RVSP: 5 MMHG
SYSTOLIC BLOOD PRESSURE: 140 MMHG
TDI LATERAL: 0.11 M/S
TDI SEPTAL: 0.06 M/S
TDI: 0.09 M/S
TR MAX PG: 20 MMHG
TV REST PULMONARY ARTERY PRESSURE: 23 MMHG
Z-SCORE OF LEFT VENTRICULAR DIMENSION IN END DIASTOLE: -9.41
Z-SCORE OF LEFT VENTRICULAR DIMENSION IN END SYSTOLE: -6.71

## 2024-07-31 PROCEDURE — 78452 HT MUSCLE IMAGE SPECT MULT: CPT

## 2024-07-31 PROCEDURE — 78452 HT MUSCLE IMAGE SPECT MULT: CPT | Mod: 26,,, | Performed by: INTERNAL MEDICINE

## 2024-07-31 PROCEDURE — A9502 TC99M TETROFOSMIN: HCPCS | Performed by: EMERGENCY MEDICINE

## 2024-07-31 PROCEDURE — 93017 CV STRESS TEST TRACING ONLY: CPT

## 2024-07-31 PROCEDURE — 93306 TTE W/DOPPLER COMPLETE: CPT

## 2024-07-31 PROCEDURE — 93016 CV STRESS TEST SUPVJ ONLY: CPT | Mod: ,,, | Performed by: NURSE PRACTITIONER

## 2024-07-31 PROCEDURE — 63600175 PHARM REV CODE 636 W HCPCS: Performed by: EMERGENCY MEDICINE

## 2024-07-31 PROCEDURE — 93306 TTE W/DOPPLER COMPLETE: CPT | Mod: 26,,, | Performed by: GENERAL PRACTICE

## 2024-07-31 PROCEDURE — 93018 CV STRESS TEST I&R ONLY: CPT | Mod: ,,, | Performed by: INTERNAL MEDICINE

## 2024-07-31 RX ORDER — REGADENOSON 0.08 MG/ML
0.4 INJECTION, SOLUTION INTRAVENOUS
Status: COMPLETED | OUTPATIENT
Start: 2024-07-31 | End: 2024-07-31

## 2024-07-31 RX ADMIN — REGADENOSON 0.4 MG: 0.08 INJECTION, SOLUTION INTRAVENOUS at 08:07

## 2024-07-31 RX ADMIN — TETROFOSMIN 25.8 MILLICURIE: 1.38 INJECTION, POWDER, LYOPHILIZED, FOR SOLUTION INTRAVENOUS at 08:07

## 2024-07-31 RX ADMIN — TETROFOSMIN 12.1 MILLICURIE: 1.38 INJECTION, POWDER, LYOPHILIZED, FOR SOLUTION INTRAVENOUS at 06:07

## 2024-08-13 DIAGNOSIS — E83.52 HYPERCALCEMIA: ICD-10-CM

## 2024-08-13 DIAGNOSIS — I95.1 ORTHOSTATIC HYPOTENSION: Primary | ICD-10-CM

## 2024-08-13 DIAGNOSIS — R10.9 UNSPECIFIED ABDOMINAL PAIN: ICD-10-CM

## 2024-08-13 DIAGNOSIS — G90.9 DISORDER OF THE AUTONOMIC NERVOUS SYSTEM, UNSPECIFIED: ICD-10-CM

## 2024-08-15 ENCOUNTER — HOSPITAL ENCOUNTER (OUTPATIENT)
Dept: RADIOLOGY | Facility: HOSPITAL | Age: 69
Discharge: HOME | End: 2024-08-15
Attending: EMERGENCY MEDICINE
Payer: MEDICARE

## 2024-08-15 DIAGNOSIS — E83.52 HYPERCALCEMIA: ICD-10-CM

## 2024-08-15 DIAGNOSIS — G90.9 DISORDER OF THE AUTONOMIC NERVOUS SYSTEM, UNSPECIFIED: ICD-10-CM

## 2024-08-15 DIAGNOSIS — I95.1 ORTHOSTATIC HYPOTENSION: ICD-10-CM

## 2024-08-15 DIAGNOSIS — R10.9 UNSPECIFIED ABDOMINAL PAIN: ICD-10-CM

## 2024-08-15 PROCEDURE — 74150 CT ABDOMEN W/O CONTRAST: CPT | Mod: TC

## 2024-08-15 PROCEDURE — 70551 MRI BRAIN STEM W/O DYE: CPT | Mod: 26,,, | Performed by: RADIOLOGY

## 2024-08-15 PROCEDURE — A9698 NON-RAD CONTRAST MATERIALNOC: HCPCS

## 2024-08-15 PROCEDURE — 71046 X-RAY EXAM CHEST 2 VIEWS: CPT | Mod: TC

## 2024-08-15 PROCEDURE — 71250 CT THORAX DX C-: CPT | Mod: TC

## 2024-08-15 PROCEDURE — 74150 CT ABDOMEN W/O CONTRAST: CPT | Mod: 26,,, | Performed by: RADIOLOGY

## 2024-08-15 PROCEDURE — 71250 CT THORAX DX C-: CPT | Mod: 26,,, | Performed by: RADIOLOGY

## 2024-08-15 PROCEDURE — 70551 MRI BRAIN STEM W/O DYE: CPT | Mod: TC

## 2024-08-15 PROCEDURE — 71046 X-RAY EXAM CHEST 2 VIEWS: CPT | Mod: 26,,, | Performed by: RADIOLOGY

## 2024-08-15 PROCEDURE — 25500020 PHARM REV CODE 255

## 2024-08-15 RX ADMIN — IOHEXOL 500 ML: 12 SOLUTION ORAL at 06:08
